# Patient Record
Sex: FEMALE | Race: WHITE | NOT HISPANIC OR LATINO | Employment: FULL TIME | URBAN - METROPOLITAN AREA
[De-identification: names, ages, dates, MRNs, and addresses within clinical notes are randomized per-mention and may not be internally consistent; named-entity substitution may affect disease eponyms.]

---

## 2017-05-08 ENCOUNTER — HOSPITAL ENCOUNTER (OUTPATIENT)
Dept: MAMMOGRAPHY | Facility: CLINIC | Age: 52
Discharge: HOME | End: 2017-05-08
Attending: FAMILY MEDICINE | Admitting: FAMILY MEDICINE
Payer: COMMERCIAL

## 2017-05-08 ENCOUNTER — OFFICE VISIT (OUTPATIENT)
Dept: ENDOCRINOLOGY | Facility: CLINIC | Age: 52
End: 2017-05-08
Payer: COMMERCIAL

## 2017-05-08 VITALS
BODY MASS INDEX: 32.13 KG/M2 | WEIGHT: 212 LBS | DIASTOLIC BLOOD PRESSURE: 85 MMHG | RESPIRATION RATE: 16 BRPM | TEMPERATURE: 97.7 F | HEART RATE: 82 BPM | SYSTOLIC BLOOD PRESSURE: 130 MMHG | HEIGHT: 68 IN

## 2017-05-08 DIAGNOSIS — E06.3 CHRONIC LYMPHOCYTIC THYROIDITIS: ICD-10-CM

## 2017-05-08 DIAGNOSIS — R73.01 IMPAIRED FASTING GLUCOSE: ICD-10-CM

## 2017-05-08 DIAGNOSIS — Z12.31 VISIT FOR SCREENING MAMMOGRAM: ICD-10-CM

## 2017-05-08 DIAGNOSIS — R63.5 ABNORMAL WEIGHT GAIN: ICD-10-CM

## 2017-05-08 DIAGNOSIS — E06.3 HYPOTHYROIDISM DUE TO HASHIMOTO'S THYROIDITIS: ICD-10-CM

## 2017-05-08 DIAGNOSIS — R53.83 FATIGUE, UNSPECIFIED TYPE: ICD-10-CM

## 2017-05-08 DIAGNOSIS — E04.1 THYROID NODULE: Primary | ICD-10-CM

## 2017-05-08 PROCEDURE — 99204 OFFICE O/P NEW MOD 45 MIN: CPT | Performed by: CLINICAL NURSE SPECIALIST

## 2017-05-08 PROCEDURE — G0202 SCR MAMMO BI INCL CAD: HCPCS

## 2017-05-08 RX ORDER — AMPICILLIN TRIHYDRATE 250 MG
CAPSULE ORAL
COMMUNITY
End: 2017-05-22

## 2017-05-08 NOTE — NURSING NOTE
"Chief Complaint   Patient presents with     Thyroid Problem       Initial /85 (BP Location: Left arm, Patient Position: Chair, Cuff Size: Adult Regular)  Pulse 82  Temp 97.7  F (36.5  C) (Oral)  Resp 16  Ht 5' 7.75\" (1.721 m)  Wt 212 lb (96.2 kg)  LMP 08/01/2007  BMI 32.47 kg/m2 Estimated body mass index is 32.47 kg/(m^2) as calculated from the following:    Height as of this encounter: 5' 7.75\" (1.721 m).    Weight as of this encounter: 212 lb (96.2 kg).  Medication Reconciliation: complete    "

## 2017-05-08 NOTE — MR AVS SNAPSHOT
After Visit Summary   5/8/2017    Barbara Smiley    MRN: [id number]           Patient Information     Date Of Birth          1965        Visit Information        Provider Department      5/8/2017 1:30 PM Gena Martinez APRN CNP [facility]        Today's Diagnoses     Thyroid nodule    -  1    Abnormal weight gain        Chronic lymphocytic thyroiditis        Impaired fasting glucose        Hypothyroidism due to Hashimoto's thyroiditis        Fatigue, unspecified type           Follow-ups after your visit        Follow-up notes from your care team     Return in about 2 weeks (around 5/22/2017).      Your next 10 appointments already scheduled     May 12, 2017  8:30 AM CDT   LAB with CR LAB   [facility] ([facility])    [id number] [place] [address] [phone]           Patient must bring picture ID.  Patient should be prepared to give a urine specimen  Please do not eat 10-12 hours before your appointment if you are coming in fasting for labs on lipids, cholesterol, or glucose (sugar).  Pregnant women should follow their Care Team instructions. Water with medications is okay. Do not drink coffee or other fluids.   If you have concerns about taking  your medications, please ask at office or if scheduling via [employer], send a message by clicking on Secure Messaging, Message Your Care Team.            May 12, 2017 10:30 AM CDT   US THYROID with [id number]   [facility] ([facility])    [address]  [facility] [id number] [phone]           Please bring a list of your medicines (including vitamins, minerals and over-the-counter drugs). Also, tell your doctor about any allergies you may have. Wear comfortable clothes and leave your valuables at home.  You do not need to do anything special to prepare for your exam.  Please call the Imaging  Department at your exam site with any questions.            May 22, 2017  3:30 PM CDT   Return Visit with BRIAN Parker CNP   [facility] ([facility])    [address]  [facility] [address] [phone]              Future tests that were ordered for you today     Open Future Orders        Priority Expected Expires Ordered    CBC with platelets Routine  7/8/2017 5/8/2017    Ferritin Routine  7/8/2017 5/8/2017    Vitamin D Deficiency Routine  5/8/2018 5/8/2017    Hemoglobin A1c Routine  8/8/2017 5/8/2017    T3 Free Routine  8/8/2017 5/8/2017    T3 total Routine  8/8/2017 5/8/2017    TSH Routine  8/8/2017 5/8/2017    T4 FREE Routine  8/8/2017 5/8/2017    Thyroid peroxidase antibody Routine  8/8/2017 5/8/2017    Anti thyroglobulin antibody Routine  8/8/2017 5/8/2017    Erythrocyte sedimentation rate auto Routine  8/8/2017 5/8/2017    C-peptide Routine  8/8/2017 5/8/2017    Insulin level Routine  8/8/2017 5/8/2017    Lipid panel reflex to direct LDL Routine  8/8/2017 5/8/2017    Comprehensive metabolic panel Routine  8/8/2017 5/8/2017    US Thyroid Routine  5/8/2018 5/8/2017    Cortisol free urine Routine  5/8/2018 5/8/2017            Who to contact     If you have questions or need follow up information about today's clinic visit or your schedule please contact [facility] directly at [phone].  Normal or non-critical lab and imaging results will be communicated to you by MyChart, letter or phone within 4 business days after the clinic has received the results. If you do not hear from us within 7 days, please contact the clinic through MyChart or phone. If you have a critical or abnormal lab result, we will notify you by phone as soon as possible.  Submit refill requests through [employer] or call your pharmacy and they will forward the refill request to us. Please allow 3 business days for your refill to be completed.          Additional  "Information About Your Visit        MyChart Information     [employer] gives you secure access to your electronic health record. If you see a primary care provider, you can also send messages to your care team and make appointments. If you have questions, please call your primary care clinic.  If you do not have a primary care provider, please call [phone] and they will assist you.        Care EveryWhere ID     This is your Care EveryWhere ID. This could be used by other organizations to access your [place] medical records  [id number]        Your Vitals Were     Pulse Temperature Respirations Height Last Period BMI (Body Mass Index)    82 97.7  F (36.5  C) (Oral) 16 1.721 m (5' 7.75\") 08/01/2007 32.47 kg/m2       Blood Pressure from Last 3 Encounters:   05/08/17 130/85   02/28/14 124/84   02/25/14 124/62    Weight from Last 3 Encounters:   05/08/17 96.2 kg (212 lb)   02/28/14 105.2 kg (232 lb)   02/25/14 105.6 kg (232 lb 14.4 oz)               Primary Care Provider    [facility] Other Clinic                Thank you!     Thank you for choosing [facility]  for your care. Our goal is always to provide you with excellent care. Hearing back from our patients is one way we can continue to improve our services. Please take a few minutes to complete the written survey that you may receive in the mail after your visit with us. Thank you!             Your Updated Medication List - Protect others around you: Learn how to safely use, store and throw away your medicines at www.disposemymeds.org.          This list is accurate as of: 5/8/17  4:59 PM.  Always use your most recent med list.                   Brand Name Dispense Instructions for use    ascorbic acid 500 MG tablet    VITAMIN C     1 tab qd       atorvastatin 20 MG tablet    LIPITOR     Take 20 mg by mouth daily       cinnamon 500 MG Caps          Levothyroxine Sodium 88 MCG Caps          magnesium oxide 200 MG Tabs      daily with breakfast.       " Multi-vitamin Tabs tablet   Generic drug:  multivitamin, therapeutic with minerals          VIACTIV 500-100-40 MG-UNT-MCG Chew   Generic drug:  Calcium-Vitamin D-Vitamin K          Zinc Methionate 50 MG Caps      Take 50 mg by mouth

## 2017-05-08 NOTE — PROGRESS NOTES
Name: Barbara Smiley  Seen at the request of No ref. provider found for   Chief Complaint   Patient presents with     Thyroid Problem     HPI:  Barbara Smiley is a 52 year old female who presents for the evaluation of hypothyroidism and thyroid nodules.  Currently treated with levothyroxine 88 mcg/day.  She was initially noted to have an enlarged thyroid gland on exam during a routine GYN visit in 2007.   Follow up ultrasound showed bilateral thyroid nodules.  She was referred to Dr. Jose, endocrinologist, for further evaluation.  She states labs done by Dr. Jose showed hypothyroidism and Hashimoto's.  She was started on levothyroxine and a FNA biopsy was done.  She recalls the FNA biopsy being benign.    She states there were a number of health insurance changes and she was not able to continue following up with Dr. Jose.  She was subsequently followed by [facility] for awhile, but again, due to insurance changes, she could no longer follow up there.  Her last thyroid ultrasound was done in 2008 - this showed right lobe nodules unchanged, a 1.3 cm solid left lobe nodule that was stable in size, and a newly noted 0.9 cm right isthmus nodule.  She has not had a repeat thyroid nodule since 2008.  She denies any difficulty swallowing or voice changes.  No history of radiation treatments to the head or neck.  No known family history of thyroid cancer although + FH of hypothyroidism.    She continues to feel poorly - constant body/joint aches and fatigue/low energy.  She was previously noted to have IFG.  FH is + for DM2.  She also notes continued weight gain and difficulty losing weight with effort.  She recently started Gladys Castillo + started exercising and has been able to lose 12 lbs.  She was briefly treated with Phentermine while being seen at [facility] but didn't like the way she felt while taking it and didn't feel it was particularly effective.  She wonders if there is anything else besides  levothyroxine to treat her hypothyroidism.      Palpitations:  No  Changes to hair or skin: No  Diarrhea/Constipation:No  Dysphagia or Shortness of breath:No  Muscle aches or pain:Yes: generalized muscle and joint pain  Tremors:No  Difficulty sleeping:Yes: over the past 6-9 months  Changes in weight: Yes: unexplained weight gain, has recently been able to lose 12 lbs with effort (Gladys Jonathan + regular exercise).  Heat or cold intolerance: ? cold  History of Lithium or Amiodarone use:No  Head or neck surgery/radiation:No  IV Contrast: N/A  PMH/PSH:  Past Medical History:   Diagnosis Date     Chronic lymphocytic thyroiditis     Hashimoto thyroiditis     Esophageal reflux      Urinary tract infection, site not specified     in the past     Past Surgical History:   Procedure Laterality Date     ENT SURGERY  toddler    Tosillectomy     GYN SURGERY  2007    novasure     HYSTEROSCOPY  2008    removal of ovarian cyst and endometriosis      Family Hx:  Family History   Problem Relation Age of Onset     C.A.D. Mother      balloon surgery     DIABETES Mother      Cardiovascular Mother      atrial fibrillation      Thyroid Disease Mother      CANCER Mother      Esphogeal Cancer     C.A.D. Father      bypass      DIABETES Father      C.A.D. Paternal Grandmother      DIABETES Paternal Grandmother      C.A.D. Paternal Grandfather      DIABETES Paternal Grandfather      Breast Cancer No family hx of      Cancer - colorectal No family hx of      Prostate Cancer No family hx of      Thyroid disease:  Yes, mother        DM2: Yes, Mother, father, PGM, PGF        Autoimmune: DM1, SLE, RA, Vitiligo     Social Hx:  Social History     Social History     Marital status:      Spouse name: N/A     Number of children: N/A     Years of education: N/A     Occupational History     Not on file.     Social History Main Topics     Smoking status: Never Smoker     Smokeless tobacco: Never Used     Alcohol use Yes      Comment: a couple drinks  "per year     Drug use: No     Sexual activity: Yes     Partners: Male     Birth control/ protection: Surgical     Other Topics Concern     Parent/Sibling W/ Cabg, Mi Or Angioplasty Before 65f 55m? No     Social History Narrative          MEDICATIONS:  has a current medication list which includes the following prescription(s): atorvastatin, levothyroxine sodium, ascorbic acid, multi-vitamin, viactiv, cinnamon, magnesium oxide, and zinc methionate.    ROS   ROS: 10 point ROS neg other than the symptoms noted above in the HPI.    Physical Exam   VS: /85 (BP Location: Left arm, Patient Position: Chair, Cuff Size: Adult Regular)  Pulse 82  Temp 97.7  F (36.5  C) (Oral)  Resp 16  Ht 1.721 m (5' 7.75\")  Wt 96.2 kg (212 lb)  LMP 08/01/2007  BMI 32.47 kg/m2  GENERAL: AXOX3, NAD, well dressed, answering questions appropriately, appears stated age.  HEENT: no exophthalmos, no proptosis, EOMI, no lig lag, no retraction  NECK:  Supple, thyroid gland slightly enlarged and irregular - more prominent on the right, no distinct nodules palpable, no tenderness with palpation, no adenopathy  CV: RRR, no rubs, gallops, no murmurs  LUNGS: CTAB, no wheezes, rales, or rhonchi  ABDOMEN: soft, nontender, nondistended  EXTREMITIES: no edema, +pulses, no rashes, no lesions  NEUROLOGY: CN grossly intact, no tremors  MSK: grossly intact  SKIN: no rashes, no lesions    LABS:  TFTs:  !THYROID Latest Ref Rng & Units 10/26/2010   TSH 0.4 - 5.0 mU/L 0.71     TG/TPO:      ULTRASOUND THYROID 10/25/2007     HISTORY: Thyroid nodule.     COMPARISON: None.     FINDINGS: The right and left lobes of the thyroid gland measure 4.9 x  1.9 x 1.6 cm and 4.8 x 1.9 x 1.5 cm, respectively. Several small  ill-defined hypoechoic nodules are present within both lobes of the  thyroid gland. The largest nodule is in the upper aspect of the left  lobe measuring 1.3 x 1.1 x 0.7 cm. All the other nodules are less than  1 cm in mean diameter. No " microcalcifications associated with any of  the nodules.     IMPRESSION:  1. Several small ill-defined nodules present within a normal sized  thyroid gland.  2. The largest nodule is a solid nodule in the upper aspect of the  left lobe measuring 1.3 x 1.1 x 0.7 cm.    EXAM:  US THYROID 7/10/2008     HISTORY:  Multinodular goiter.  Compare to previous US done 10/25/07.   Stat read and fax results to [phone], pt has 9am appt on  07/11/08.      FINDINGS: The study is compared with the previous of 10/25/2007.     The right lobe measures 4.9 cm in length by 1.7 x 1.9 cm. This is  unchanged. The left lobe measures 4.7 x 1.6 x 1.7 cm and is also  unchanged. No change in the AP dimension of the isthmus at just over 2  mm in diameter.     The nodules within the right lobe are unchanged. However there is a  new solid nodule measuring 9 mm by 4 x 7 within the right isthmus. In  the left upper pole there is a 13 mm solid nodule which is stable.  Stable appearance of a cystic lesion at 7 mm at the left upper pole. A  large nodule that had been seen in the lateral left midpole and at the  lower left pole are not re demonstrated today. No other significant  change.     All pertinent notes, labs, and images personally reviewed by me.     A/P  Ms.Bronwyn ISAIAH Smiley is a 52 year old here for the evaluation of hypothyroidism:    1. Hypothyroidism.  Differential includes: autoimmune disease (Hashimoto's thyroiditis), treatment for hyperthyroidism, radiation therapy, thyroid surgery, medications, congenital disease, pituitary disorder, pregnancy, and iodine deficiency.  Persons with Hashimoto's thyroiditis have serum antibodies reacting with TG, TPO, and against an unidentified protein present in colloid.     Standard treatment for hypothyroidism involves daily use of the synthetic thyroid hormone levothyroxine (Levothroid, Synthroid, others).  The dosage of thyroxine should normally be that required to bring the serum TSH  level to the low normal range, such as 0.3 - 1 uU/ml. This is typically achieved with 1 ug L-T4/lb body weight/day, ranges from 75 - 125 ug/day in women, and 125 - 200 ug/day in men. Once thyroxine treatment is initiated, it is required indefinitely in most patients.     Symptoms should improve one to two weeks after starting treatment. Treatment with levothyroxine is usually lifelong.  Dosage may need to be adjusted based on body weight, medications, or pregnancy.  To determine the right dosage of levothyroxine initially we will repeat TSH and free T4 after two months.    Excessive amounts of the hormone can cause side effects, such as: Increased appetite, insomnia, heart palpitations, and shakiness.  Patients with CAD will be started on a lower dose.  Levothyroxine causes virtually no side effects when used in the appropriate dose.    Certain medications, supplements and foods can affect the body s ability to absorb levothyroxine. Medications include: Iron supplements, Cholestyramine and Calcium supplements.     Patient was advised that decreased absorption of levothyroxine might occur if taken concomitantly with food or within four hours of taking calcium, iron, soy or aluminum containing antacids. Generic substitution for brand name and vice versa, or substitution of one generic formulation for another may cause abnormal TSH levels on a previously stable dose of thyroid hormone. Pt was advised that regular monitoring of thyroid function, as prescribed by the physician, and adherence to dose prescribed, is important.    Will obtain TFT's and adjust levothyroxine dose or consider addition of Cytomel if indicated.    2.  Thyroid nodules:  Thyroid nodules are common and are frequently benign. Data suggest that the prevalence of palpable thyroid nodules is 3% to 7% in [place]; the prevalence is as high as 50% based on ultrasonography (US) or autopsy data. All patients with a palpable thyroid nodule, however,  should undergo US examination. US-guided FNA (US-FNA) is recommended for nodules ?10 mm; US-FNA is suggested for nodules <10 mm only if clinical information or US features are suspicious.    Causes of thyroid Nodules: Benign (Multinodular goiter, Hashimoto s thyroiditis, Simple or hemorrhagic cysts, Follicular adenomas, Subacute thyroiditis) or Malignant(Papillary carcinoma, Follicular carcinoma, Hürthle cell carcinoma, Medullary carcinoma, Anaplastic carcinoma, Primary thyroid lymphoma, or Metastatic malignant lesion).    MultiNodule:  The risk of cancer is not significantly higher in palpable solitary thyroid nodules than in multinodular lesions or in nodules in diffuse goiters. In multinodular thyroid glands, the cytologic sampling should be focused on lesions characterized by suspicious US features rather than on larger or clinically dominant nodules.    Cyst:  Most complex thyroid nodules with a dominant fluid component are benign. USFNA, however, should always be performed because the rare papillary thyroid carcinoma (PTC) can be cystic. An unsatisfactory (nondiagnostic) specimen usually results from a cystic nodule that yields few or no follicular cells. Reaspiration yields satisfactory results in 50% of cases.    Fine-Needle Aspiration Cytologic Diagnosis: 70% of FNA specimens are classified as benign; in addition, 5% are malignant, 10% are suspicious, and 10% to 20% are nondiagnostic or unsatisfactory. At surgical intervention, about 20% of such indeterminate/suspicious specimens are found to be malignant lesions.    Despite good initial technique, repeated biopsy, and US-FNA, approximately 5% of nodules still remain nondiagnostic. Such thyroid nodules should be surgically excised.    Will obtain repeat thyroid ultrasound.  If any significant increase in nodules sizes, will plan to schedule FNA biopsy.    3.  Fatigue, body pain, weight gain.  Will screen for prediabetes/diabetes, cortisol to r/o cushings,  check D level, sed rate, CBC and ferritin, + TFT's to rule out possible causes.    Labs ordered today:   Orders Placed This Encounter   Procedures     US Thyroid     Cortisol free urine     Hemoglobin A1c     T3 Free     T3 total     TSH     T4 FREE     Thyroid peroxidase antibody     Anti thyroglobulin antibody     Erythrocyte sedimentation rate auto     C-peptide     Insulin level     Lipid panel reflex to direct LDL     Comprehensive metabolic panel     Vitamin D Deficiency     CBC with platelets     Ferritin       More than 50% of the time spent with Ms. Smiley on counseling / coordinating her care.  Total face to face time was greater than or equal to 45 minutes.      Follow-up:  follow up in 1-2 week(s) to review test results.    Gena Martinez NP  Endocrinology  [facility]  CC: Other Clinic, [place]

## 2017-05-12 ENCOUNTER — HOSPITAL ENCOUNTER (OUTPATIENT)
Dept: ULTRASOUND IMAGING | Facility: CLINIC | Age: 52
Discharge: HOME | End: 2017-05-12
Attending: CLINICAL NURSE SPECIALIST | Admitting: CLINICAL NURSE SPECIALIST
Payer: COMMERCIAL

## 2017-05-12 DIAGNOSIS — E04.1 THYROID NODULE: ICD-10-CM

## 2017-05-12 DIAGNOSIS — R63.5 ABNORMAL WEIGHT GAIN: ICD-10-CM

## 2017-05-12 PROCEDURE — 99000 SPECIMEN HANDLING OFFICE-LAB: CPT | Performed by: CLINICAL NURSE SPECIALIST

## 2017-05-12 PROCEDURE — 76536 US EXAM OF HEAD AND NECK: CPT

## 2017-05-12 PROCEDURE — 82530 CORTISOL FREE: CPT | Mod: 90 | Performed by: CLINICAL NURSE SPECIALIST

## 2017-05-13 DIAGNOSIS — R53.83 FATIGUE, UNSPECIFIED TYPE: ICD-10-CM

## 2017-05-13 DIAGNOSIS — R73.01 IMPAIRED FASTING GLUCOSE: ICD-10-CM

## 2017-05-13 DIAGNOSIS — E04.1 THYROID NODULE: ICD-10-CM

## 2017-05-13 DIAGNOSIS — R63.5 ABNORMAL WEIGHT GAIN: ICD-10-CM

## 2017-05-13 DIAGNOSIS — E06.3 CHRONIC LYMPHOCYTIC THYROIDITIS: ICD-10-CM

## 2017-05-13 DIAGNOSIS — E06.3 HYPOTHYROIDISM DUE TO HASHIMOTO'S THYROIDITIS: ICD-10-CM

## 2017-05-13 LAB
ALBUMIN SERPL-MCNC: 4 G/DL (ref 3.4–5)
ALP SERPL-CCNC: 85 U/L (ref 40–150)
ALT SERPL W P-5'-P-CCNC: 37 U/L (ref 0–50)
ANION GAP SERPL CALCULATED.3IONS-SCNC: 9 MMOL/L (ref 3–14)
AST SERPL W P-5'-P-CCNC: 11 U/L (ref 0–45)
BILIRUB SERPL-MCNC: 0.4 MG/DL (ref 0.2–1.3)
BUN SERPL-MCNC: 15 MG/DL (ref 7–30)
CALCIUM SERPL-MCNC: 9.2 MG/DL (ref 8.5–10.1)
CHLORIDE SERPL-SCNC: 110 MMOL/L (ref 94–109)
CHOLEST SERPL-MCNC: 157 MG/DL
CO2 SERPL-SCNC: 24 MMOL/L (ref 20–32)
CREAT SERPL-MCNC: 0.74 MG/DL (ref 0.52–1.04)
ERYTHROCYTE [DISTWIDTH] IN BLOOD BY AUTOMATED COUNT: 13 % (ref 10–15)
ERYTHROCYTE [SEDIMENTATION RATE] IN BLOOD BY WESTERGREN METHOD: 9 MM/H (ref 0–30)
FERRITIN SERPL-MCNC: 112 NG/ML (ref 8–252)
GFR SERPL CREATININE-BSD FRML MDRD: 82 ML/MIN/1.7M2
GLUCOSE SERPL-MCNC: 100 MG/DL (ref 70–99)
HBA1C MFR BLD: 5.5 % (ref 4.3–6)
HCT VFR BLD AUTO: 40 % (ref 35–47)
HDLC SERPL-MCNC: 45 MG/DL
HGB BLD-MCNC: 13.4 G/DL (ref 11.7–15.7)
INSULIN SERPL-ACNC: 19.9 MU/L (ref 3–25)
LDLC SERPL CALC-MCNC: 90 MG/DL
MCH RBC QN AUTO: 29.4 PG (ref 26.5–33)
MCHC RBC AUTO-ENTMCNC: 33.5 G/DL (ref 31.5–36.5)
MCV RBC AUTO: 88 FL (ref 78–100)
NONHDLC SERPL-MCNC: 112 MG/DL
PLATELET # BLD AUTO: 300 10E9/L (ref 150–450)
POTASSIUM SERPL-SCNC: 4 MMOL/L (ref 3.4–5.3)
PROT SERPL-MCNC: 7.2 G/DL (ref 6.8–8.8)
RBC # BLD AUTO: 4.56 10E12/L (ref 3.8–5.2)
SODIUM SERPL-SCNC: 143 MMOL/L (ref 133–144)
T3 SERPL-MCNC: 87 NG/DL (ref 60–181)
T3FREE SERPL-MCNC: 2.3 PG/ML (ref 2.3–4.2)
T4 FREE SERPL-MCNC: 0.88 NG/DL (ref 0.76–1.46)
TRIGL SERPL-MCNC: 108 MG/DL
TSH SERPL DL<=0.05 MIU/L-ACNC: 1.65 MU/L (ref 0.4–4)
WBC # BLD AUTO: 7.8 10E9/L (ref 4–11)

## 2017-05-13 PROCEDURE — 86376 MICROSOMAL ANTIBODY EACH: CPT | Performed by: CLINICAL NURSE SPECIALIST

## 2017-05-13 PROCEDURE — 84480 ASSAY TRIIODOTHYRONINE (T3): CPT | Performed by: CLINICAL NURSE SPECIALIST

## 2017-05-13 PROCEDURE — 82306 VITAMIN D 25 HYDROXY: CPT | Performed by: CLINICAL NURSE SPECIALIST

## 2017-05-13 PROCEDURE — 84481 FREE ASSAY (FT-3): CPT | Performed by: CLINICAL NURSE SPECIALIST

## 2017-05-13 PROCEDURE — 86800 THYROGLOBULIN ANTIBODY: CPT | Performed by: CLINICAL NURSE SPECIALIST

## 2017-05-13 PROCEDURE — 84443 ASSAY THYROID STIM HORMONE: CPT | Performed by: CLINICAL NURSE SPECIALIST

## 2017-05-13 PROCEDURE — 84681 ASSAY OF C-PEPTIDE: CPT | Performed by: CLINICAL NURSE SPECIALIST

## 2017-05-13 PROCEDURE — 85652 RBC SED RATE AUTOMATED: CPT | Performed by: CLINICAL NURSE SPECIALIST

## 2017-05-13 PROCEDURE — 83525 ASSAY OF INSULIN: CPT | Performed by: CLINICAL NURSE SPECIALIST

## 2017-05-13 PROCEDURE — 85027 COMPLETE CBC AUTOMATED: CPT | Performed by: CLINICAL NURSE SPECIALIST

## 2017-05-13 PROCEDURE — 83036 HEMOGLOBIN GLYCOSYLATED A1C: CPT | Performed by: CLINICAL NURSE SPECIALIST

## 2017-05-13 PROCEDURE — 82728 ASSAY OF FERRITIN: CPT | Performed by: CLINICAL NURSE SPECIALIST

## 2017-05-13 PROCEDURE — 80061 LIPID PANEL: CPT | Performed by: CLINICAL NURSE SPECIALIST

## 2017-05-13 PROCEDURE — 84439 ASSAY OF FREE THYROXINE: CPT | Performed by: CLINICAL NURSE SPECIALIST

## 2017-05-13 PROCEDURE — 36415 COLL VENOUS BLD VENIPUNCTURE: CPT | Performed by: CLINICAL NURSE SPECIALIST

## 2017-05-13 PROCEDURE — 80053 COMPREHEN METABOLIC PANEL: CPT | Performed by: CLINICAL NURSE SPECIALIST

## 2017-05-14 LAB
COLLECT DURATION TIME SPEC: NORMAL H
CORTIS F 24H UR HPLC-MCNC: 6.9 UG/ML
CORTIS F 24H UR-MRATE: 20.4
CORTIS F/CREAT 24H UR: 16.83
CREAT 24H UR-MCNC: 41 MG/DL
CREAT 24H UR-MRATE: 1210 G/(24.H)
DEPRECATED CALCIDIOL+CALCIFEROL SERPL-MC: 48 UG/L (ref 20–75)
IMP & REVIEW OF LAB RESULTS: NORMAL
SPECIMEN VOL ?TM UR: NORMAL ML

## 2017-05-15 LAB
C PEPTIDE SERPL-MCNC: 2.6 NG/ML (ref 0.9–6.9)
THYROGLOB AB SERPL IA-ACNC: <20 IU/ML (ref 0–40)
THYROPEROXIDASE AB SERPL-ACNC: 191 IU/ML

## 2017-05-16 NOTE — PROGRESS NOTES
Barbara,  Your thyroid antibodies were elevated confirming that you have autoimmune thyroid disease.  Your vitamin D and iron levels look good.  Your glucose measures did show elevated, or impaired fasting glucose.  I don't see any obvious cause of your current symptoms.  I recommend trying Metformin to treat the impaired fasting glucose and see if that helps with the weight issues and possibly with your symptoms.  Start with one Metformin per day, taken with food.  Increase to one tablet twice daily in one week if tolerating ok.  I'll send the prescription to your pharmacy.  Let me know if you have any questions.  I'll see you at your follow up visit.  Gena Martinez NP  Endocrinology

## 2017-05-16 NOTE — PROGRESS NOTES
Barbara,  Good news!  Your recent thyroid ultrasound did not show any thyroid nodules.  There were findings indicating thyroiditis, or inflammation.  This is due to the autoimmune thyroid disease (Hashimoto's).  Here's a copy of the results for your records.  We will discuss the results in more detail at your follow up visit.  Gena Martinez NP  Endocrinology

## 2017-05-22 ENCOUNTER — OFFICE VISIT (OUTPATIENT)
Dept: ENDOCRINOLOGY | Facility: CLINIC | Age: 52
End: 2017-05-22
Payer: COMMERCIAL

## 2017-05-22 VITALS
TEMPERATURE: 97.3 F | WEIGHT: 221 LBS | HEART RATE: 77 BPM | DIASTOLIC BLOOD PRESSURE: 80 MMHG | BODY MASS INDEX: 33.85 KG/M2 | SYSTOLIC BLOOD PRESSURE: 155 MMHG

## 2017-05-22 DIAGNOSIS — R73.01 IMPAIRED FASTING GLUCOSE: ICD-10-CM

## 2017-05-22 DIAGNOSIS — E03.4 HYPOTHYROIDISM DUE TO ACQUIRED ATROPHY OF THYROID: Primary | ICD-10-CM

## 2017-05-22 DIAGNOSIS — Z86.39 HX OF THYROID NODULE: ICD-10-CM

## 2017-05-22 DIAGNOSIS — E06.3 CHRONIC LYMPHOCYTIC THYROIDITIS: ICD-10-CM

## 2017-05-22 PROCEDURE — 99214 OFFICE O/P EST MOD 30 MIN: CPT | Performed by: CLINICAL NURSE SPECIALIST

## 2017-05-22 RX ORDER — AMPICILLIN TRIHYDRATE 250 MG
CAPSULE ORAL
COMMUNITY
End: 2017-08-17

## 2017-05-22 NOTE — MR AVS SNAPSHOT
After Visit Summary   5/22/2017    Barbara Smiley    MRN: [id number]           Patient Information     Date Of Birth          1965        Visit Information        Provider Department      5/22/2017 3:30 PM Gena Martinez APRN CNP [facility]        Today's Diagnoses     Hypothyroidism due to acquired atrophy of thyroid    -  1    Chronic lymphocytic thyroiditis        Impaired fasting glucose        Hx of thyroid nodule           Follow-ups after your visit        Follow-up notes from your care team     Return in about 3 months (around 8/22/2017).      Who to contact     If you have questions or need follow up information about today's clinic visit or your schedule please contact [facility] directly at [phone].  Normal or non-critical lab and imaging results will be communicated to you by [employer], letter or phone within 4 business days after the clinic has received the results. If you do not hear from us within 7 days, please contact the clinic through [employer] or phone. If you have a critical or abnormal lab result, we will notify you by phone as soon as possible.  Submit refill requests through [employer] or call your pharmacy and they will forward the refill request to us. Please allow 3 business days for your refill to be completed.          Additional Information About Your Visit        MyChart Information     [employer] gives you secure access to your electronic health record. If you see a primary care provider, you can also send messages to your care team and make appointments. If you have questions, please call your primary care clinic.  If you do not have a primary care provider, please call [phone] and they will assist you.        Care EveryWhere ID     This is your Care EveryWhere ID. This could be used by other organizations to access your [place] medical records  [id number]        Your Vitals Were     Pulse Temperature Last  Period BMI (Body Mass Index)          77 97.3  F (36.3  C) (Oral) 08/01/2007 33.85 kg/m2         Blood Pressure from Last 3 Encounters:   05/22/17 155/80   05/08/17 130/85   02/28/14 124/84    Weight from Last 3 Encounters:   05/22/17 100.2 kg (221 lb)   05/08/17 96.2 kg (212 lb)   02/28/14 105.2 kg (232 lb)              Today, you had the following     No orders found for display       Primary Care Provider    M[facility] Other Clinic                Thank you!     Thank you for choosing [facility]  for your care. Our goal is always to provide you with excellent care. Hearing back from our patients is one way we can continue to improve our services. Please take a few minutes to complete the written survey that you may receive in the mail after your visit with us. Thank you!             Your Updated Medication List - Protect others around you: Learn how to safely use, store and throw away your medicines at www.disposemymeds.org.          This list is accurate as of: 5/22/17 11:59 PM.  Always use your most recent med list.                   Brand Name Dispense Instructions for use    ascorbic acid 500 MG tablet    VITAMIN C     1 tab qd       atorvastatin 20 MG tablet    LIPITOR     Take 20 mg by mouth daily       cinnamon 500 MG Caps          Levothyroxine Sodium 88 MCG Caps          magnesium oxide 200 MG Tabs      daily with breakfast.       metFORMIN 500 MG tablet    GLUCOPHAGE    60 tablet    Take 1 tablet (500 mg) by mouth 2 times daily (with meals)       Multi-vitamin Tabs tablet   Generic drug:  multivitamin, therapeutic with minerals          VIACTIV 500-100-40 MG-UNT-MCG Chew   Generic drug:  Calcium-Vitamin D-Vitamin K          Zinc Methionate 50 MG Caps      Take 50 mg by mouth

## 2017-05-22 NOTE — PROGRESS NOTES
Name: Barbara Smiley (Last seen 5/8/2017)  F/u for   Chief Complaint   Patient presents with     Thyroid Problem     HPI:  Barbara Smiley is a 52 year old female who presents for the evaluation of hypothyroidism and thyroid nodules.  Currently treated with levothyroxine 88 mcg/day.  She was initially noted to have an enlarged thyroid gland on exam during a routine GYN visit in 2007.   Follow up ultrasound showed bilateral thyroid nodules.  She was referred to Dr. Jose, endocrinologist, for further evaluation.  She states labs done by Dr. Jose showed hypothyroidism and Hashimoto's.  She was started on levothyroxine and a FNA biopsy was done.  She recalls the FNA biopsy being benign.    She states there were a number of health insurance changes and she was not able to continue following up with Dr. Jose.  She was subsequently followed by [facility] for awhile, but again, due to insurance changes, she could no longer follow up there.  Her last thyroid ultrasound was done in 2008 - this showed right lobe nodules unchanged, a 1.3 cm solid left lobe nodule that was stable in size, and a newly noted 0.9 cm right isthmus nodule.  She has not had a repeat thyroid nodule since 2008.  She denies any difficulty swallowing or voice changes.  No history of radiation treatments to the head or neck.  No known family history of thyroid cancer although + FH of hypothyroidism.    She continues to feel poorly - constant body/joint aches and fatigue/low energy.  She was previously noted to have IFG.  FH is + for DM2.  She also notes continued weight gain and difficulty losing weight with effort.  She recently started Gladys Castillo + started exercising and has been able to lose 12 lbs.  She was briefly treated with Phentermine while being seen at [facility] but didn't like the way she felt while taking it and didn't feel it was particularly effective.  She wonders if there is anything else besides levothyroxine to  treat her hypothyroidism.      Palpitations:  No  Changes to hair or skin: No  Diarrhea/Constipation:No  Dysphagia or Shortness of breath:No  Muscle aches or pain:Yes: generalized muscle and joint pain  Tremors:No  Difficulty sleeping:Yes: over the past 6-9 months  Changes in weight: Yes: unexplained weight gain, has recently been able to lose 12 lbs with effort (Gladys Jonathan + regular exercise).  Heat or cold intolerance: ? cold  History of Lithium or Amiodarone use:No  Head or neck surgery/radiation:No  IV Contrast: N/A  PMH/PSH:  Past Medical History:   Diagnosis Date     Chronic lymphocytic thyroiditis     Hashimoto thyroiditis     Esophageal reflux      Urinary tract infection, site not specified     in the past     Past Surgical History:   Procedure Laterality Date     ENT SURGERY  toddler    Tosillectomy     GYN SURGERY  2007    novasure     HYSTEROSCOPY  2008    removal of ovarian cyst and endometriosis      Family Hx:  Family History   Problem Relation Age of Onset     C.A.D. Mother      balloon surgery     DIABETES Mother      Cardiovascular Mother      atrial fibrillation      Thyroid Disease Mother      CANCER Mother      Esphogeal Cancer     C.A.D. Father      bypass      DIABETES Father      C.A.D. Paternal Grandmother      DIABETES Paternal Grandmother      C.A.D. Paternal Grandfather      DIABETES Paternal Grandfather      Breast Cancer No family hx of      Cancer - colorectal No family hx of      Prostate Cancer No family hx of      Thyroid disease:  Yes, mother        DM2: Yes, Mother, father, PGM, PGF        Autoimmune: DM1, SLE, RA, Vitiligo     Social Hx:  Social History     Social History     Marital status:      Spouse name: N/A     Number of children: N/A     Years of education: N/A     Occupational History     Not on file.     Social History Main Topics     Smoking status: Never Smoker     Smokeless tobacco: Never Used     Alcohol use Yes      Comment: a couple drinks per year     Drug  use: No     Sexual activity: Yes     Partners: Male     Birth control/ protection: Surgical     Other Topics Concern     Parent/Sibling W/ Cabg, Mi Or Angioplasty Before 65f 55m? No     Social History Narrative          MEDICATIONS:  has a current medication list which includes the following prescription(s): metformin, magnesium oxide, zinc methionate, atorvastatin, levothyroxine sodium, ascorbic acid, multi-vitamin, viactiv, and cinnamon.    ROS   ROS: 10 point ROS neg other than the symptoms noted above in the HPI.    Physical Exam   VS: LMP 08/01/2007  GENERAL: AXOX3, NAD, well dressed, answering questions appropriately, appears stated age.  HEENT: no exophthalmos, no proptosis, EOMI, no lig lag, no retraction  NECK:  Supple, thyroid gland slightly enlarged and irregular - more prominent on the right, no distinct nodules palpable, no tenderness with palpation, no adenopathy  CV: RRR, no rubs, gallops, no murmurs  LUNGS: CTAB, no wheezes, rales, or rhonchi  ABDOMEN: soft, nontender, nondistended  EXTREMITIES: no edema, +pulses, no rashes, no lesions  NEUROLOGY: CN grossly intact, no tremors  MSK: grossly intact  SKIN: no rashes, no lesions    LABS:  TFTs:  Component    Latest Ref Rng & Units 5/13/2017   Free T3    2.3 - 4.2 pg/mL 2.3   Triiodothyronine (T3)    60 - 181 ng/dL 87   TSH    0.40 - 4.00 mU/L 1.65   T4 Free    0.76 - 1.46 ng/dL 0.88   Thyroid Peroxidase Antibody    <35 IU/mL 191 (H)   Thyroglobulin Antibody    <40 IU/mL <20     !COMPREHENSIVE Latest Ref Rng & Units 5/13/2017   SODIUM 133 - 144 mmol/L 143   POTASSIUM 3.4 - 5.3 mmol/L 4.0   CHLORIDE 94 - 109 mmol/L 110 (H)   CO2 mmol/L    BUN 7 - 30 mg/dL 15   Creatinine 0.52 - 1.04 mg/dL 0.74   Glucose 70 - 99 mg/dL 100 (H)   ANION GAP 3 - 14 mmol/L 9   CALCIUM 8.5 - 10.1 mg/dL 9.2     !COMPREHENSIVE Latest Ref Rng & Units 10/26/2010 2/6/2013 2/25/2014   Glucose 70 - 99 mg/dL 110 (H) 85 102 (H)     !COMPREHENSIVE Latest Ref Rng & Units 5/13/2017    Glucose 70 - 99 mg/dL 100 (H)     Component    Latest Ref Rng & Units 5/13/2017   Hemoglobin A1C    4.3 - 6.0 % 5.5   C-Peptide    0.9 - 6.9 ng/mL 2.6   Insulin    3 - 25 mU/L 19.9     !LIPID/HEPATIC Latest Ref Rng & Units 5/13/2017   CHOLESTEROL <200 mg/dL 157   TRIGLYCERIDES <150 mg/dL 108   HDL CHOLESTEROL >49 mg/dL 45 (L)   LDL CHOLESTEROL, CALCULATED <100 mg/dL 90   VLDL-CHOLESTEROL 0 - 30 mg/dL    NON HDL CHOLESTEROL <130 mg/dL 112   CHOLESTEROL/HDL RATIO 0.0 - 5.0    AST 0 - 45 U/L 11   ALT 0 - 50 U/L 37     Component    Latest Ref Rng & Units 5/13/2017   WBC    4.0 - 11.0 10e9/L 7.8   RBC Count    3.8 - 5.2 10e12/L 4.56   Hemoglobin    11.7 - 15.7 g/dL 13.4   Hematocrit    35.0 - 47.0 % 40.0   MCV    78 - 100 fl 88   MCH    26.5 - 33.0 pg 29.4   MCHC    31.5 - 36.5 g/dL 33.5   RDW    10.0 - 15.0 % 13.0   Platelet Count    150 - 450 10e9/L 300   Sed Rate    0 - 30 mm/h 9     Component      Latest Ref Rng & Units 5/13/2017   Ferritin      8 - 252 ng/mL 112     Component    Latest Ref Rng & Units 5/13/2017   Vitamin D Deficiency screening    20 - 75 ug/L 48     Component    Latest Ref Rng & Units 5/12/2017   Cortisol Free Duration Urine    h 24 HR   Volume    mL 2950 ML   Cortisol ug/g creatinine   18 years and older: Less than 32 ug/g crt   16.83   Cortisol Free Urine Random     6.90   Cortisol Free Urine     20.4   Creat/Vol     41   Creat/24hr     1210     ULTRASOUND THYROID 10/25/2007     HISTORY: Thyroid nodule.     COMPARISON: None.     FINDINGS: The right and left lobes of the thyroid gland measure 4.9 x  1.9 x 1.6 cm and 4.8 x 1.9 x 1.5 cm, respectively. Several small  ill-defined hypoechoic nodules are present within both lobes of the  thyroid gland. The largest nodule is in the upper aspect of the left  lobe measuring 1.3 x 1.1 x 0.7 cm. All the other nodules are less than  1 cm in mean diameter. No microcalcifications associated with any of  the nodules.     IMPRESSION:  1. Several small  ill-defined nodules present within a normal sized  thyroid gland.  2. The largest nodule is a solid nodule in the upper aspect of the  left lobe measuring 1.3 x 1.1 x 0.7 cm.    EXAM:  US THYROID 7/10/2008     HISTORY:  Multinodular goiter.  Compare to previous US done 10/25/07.   Stat read and fax results to [phone], pt has 9am appt on  07/11/08.      FINDINGS: The study is compared with the previous of 10/25/2007.     The right lobe measures 4.9 cm in length by 1.7 x 1.9 cm. This is  unchanged. The left lobe measures 4.7 x 1.6 x 1.7 cm and is also  unchanged. No change in the AP dimension of the isthmus at just over 2  mm in diameter.     The nodules within the right lobe are unchanged. However there is a  new solid nodule measuring 9 mm by 4 x 7 within the right isthmus. In  the left upper pole there is a 13 mm solid nodule which is stable.  Stable appearance of a cystic lesion at 7 mm at the left upper pole. A  large nodule that had been seen in the lateral left midpole and at the  lower left pole are not re demonstrated today. No other significant  Change.    ULTRASOUND THYROID 5/12/2017      HISTORY: Nontoxic single thyroid nodule.      COMPARISON: Thyroid ultrasound 7/10/2008.     FINDINGS: Thyroid ultrasound demonstrates a normal sized gland. The  right lobe measures 4.3 x 1.7 x 1.4 cm. The left lobe measures 4.3 x  1.6 x 1.7 cm. The isthmus is normal in thickness. Thyroid parenchyma  is heterogeneous in echotexture. Increased color Doppler flow is noted  throughout the gland, likely indicating underlying thyroiditis.     Thyroid nodules as follows:   Right Lobe: None.     Isthmus: None.     Left Lobe: None.         IMPRESSION: Normal-sized thyroid gland which is heterogeneous in  appearance. No discrete nodules. Increased color Doppler flow is  consistent with underlying thyroiditis.  All pertinent notes, labs, and images personally reviewed by me.     A/P  Ms.Bronwyn ISAIAH Smiley is a 52 year old  here for the evaluation of hypothyroidism:    1. Hypothyroidism secondary to Hashimoto's.  Differential includes: autoimmune disease (Hashimoto's thyroiditis), treatment for hyperthyroidism, radiation therapy, thyroid surgery, medications, congenital disease, pituitary disorder, pregnancy, and iodine deficiency.  Persons with Hashimoto's thyroiditis have serum antibodies reacting with TG, TPO, and against an unidentified protein present in colloid.     Standard treatment for hypothyroidism involves daily use of the synthetic thyroid hormone levothyroxine (Levothroid, Synthroid, others).  The dosage of thyroxine should normally be that required to bring the serum TSH level to the low normal range, such as 0.3 - 1 uU/ml. This is typically achieved with 1 ug L-T4/lb body weight/day, ranges from 75 - 125 ug/day in women, and 125 - 200 ug/day in men. Once thyroxine treatment is initiated, it is required indefinitely in most patients.     Symptoms should improve one to two weeks after starting treatment. Treatment with levothyroxine is usually lifelong.  Dosage may need to be adjusted based on body weight, medications, or pregnancy.  To determine the right dosage of levothyroxine initially we will repeat TSH and free T4 after two months.    Excessive amounts of the hormone can cause side effects, such as: Increased appetite, insomnia, heart palpitations, and shakiness.  Patients with CAD will be started on a lower dose.  Levothyroxine causes virtually no side effects when used in the appropriate dose.    Certain medications, supplements and foods can affect the body s ability to absorb levothyroxine. Medications include: Iron supplements, Cholestyramine and Calcium supplements.     Patient was advised that decreased absorption of levothyroxine might occur if taken concomitantly with food or within four hours of taking calcium, iron, soy or aluminum containing antacids. Generic substitution for brand name and vice versa,  or substitution of one generic formulation for another may cause abnormal TSH levels on a previously stable dose of thyroid hormone. Pt was advised that regular monitoring of thyroid function, as prescribed by the physician, and adherence to dose prescribed, is important.    Current TFT's in target range.  Continue Levothyroxine 88 mcg/day.    2.  History of thyroid nodules - now resolved:  Thyroid nodules are common and are frequently benign. Data suggest that the prevalence of palpable thyroid nodules is 3% to 7% in [place]; the prevalence is as high as 50% based on ultrasonography (US) or autopsy data. All patients with a palpable thyroid nodule, however, should undergo US examination. US-guided FNA (US-FNA) is recommended for nodules ?10 mm; US-FNA is suggested for nodules <10 mm only if clinical information or US features are suspicious.    Causes of thyroid Nodules: Benign (Multinodular goiter, Hashimoto s thyroiditis, Simple or hemorrhagic cysts, Follicular adenomas, Subacute thyroiditis) or Malignant(Papillary carcinoma, Follicular carcinoma, Hürthle cell carcinoma, Medullary carcinoma, Anaplastic carcinoma, Primary thyroid lymphoma, or Metastatic malignant lesion).    MultiNodule:  The risk of cancer is not significantly higher in palpable solitary thyroid nodules than in multinodular lesions or in nodules in diffuse goiters. In multinodular thyroid glands, the cytologic sampling should be focused on lesions characterized by suspicious US features rather than on larger or clinically dominant nodules.    Cyst:  Most complex thyroid nodules with a dominant fluid component are benign. USFNA, however, should always be performed because the rare papillary thyroid carcinoma (PTC) can be cystic. An unsatisfactory (nondiagnostic) specimen usually results from a cystic nodule that yields few or no follicular cells. Reaspiration yields satisfactory results in 50% of cases.    Fine-Needle Aspiration Cytologic  Diagnosis: 70% of FNA specimens are classified as benign; in addition, 5% are malignant, 10% are suspicious, and 10% to 20% are nondiagnostic or unsatisfactory. At surgical intervention, about 20% of such indeterminate/suspicious specimens are found to be malignant lesions.    Despite good initial technique, repeated biopsy, and US-FNA, approximately 5% of nodules still remain nondiagnostic. Such thyroid nodules should be surgically excised.    Repeat thyroid ultrasound showed no nodules.    3.  IFG.  Start Metformin 500 mg --> 1000 mg/day as tolerated.      Labs ordered today:   No orders of the defined types were placed in this encounter.      More than 50% of the time spent with Ms. Smiley on counseling / coordinating her care.  Total face to face time was greater than or equal to 25 minutes.      Follow-up:  follow up in 6-12 weeks    Gena Martinez NP  Endocrinology  [facility]  CC: Other Clinic, [place]

## 2017-05-22 NOTE — NURSING NOTE
"Chief Complaint   Patient presents with     Thyroid Problem       Initial /80 (BP Location: Left arm, Patient Position: Chair, Cuff Size: Adult Regular)  Pulse 77  Temp 97.3  F (36.3  C) (Oral)  Wt 221 lb (100.2 kg)  LMP 08/01/2007  BMI 33.85 kg/m2 Estimated body mass index is 33.85 kg/(m^2) as calculated from the following:    Height as of 5/8/17: 5' 7.75\" (1.721 m).    Weight as of this encounter: 221 lb (100.2 kg).  Medication Reconciliation: complete    "

## 2017-05-30 ENCOUNTER — MYC MEDICAL ADVICE (OUTPATIENT)
Dept: ENDOCRINOLOGY | Facility: CLINIC | Age: 52
End: 2017-05-30

## 2017-06-10 ENCOUNTER — HEALTH MAINTENANCE LETTER (OUTPATIENT)
Age: 52
End: 2017-06-10

## 2017-08-17 ENCOUNTER — OFFICE VISIT (OUTPATIENT)
Dept: ENDOCRINOLOGY | Facility: CLINIC | Age: 52
End: 2017-08-17
Payer: COMMERCIAL

## 2017-08-17 VITALS
BODY MASS INDEX: 29.1 KG/M2 | TEMPERATURE: 97.5 F | HEART RATE: 66 BPM | SYSTOLIC BLOOD PRESSURE: 114 MMHG | WEIGHT: 190 LBS | DIASTOLIC BLOOD PRESSURE: 70 MMHG | RESPIRATION RATE: 16 BRPM | OXYGEN SATURATION: 97 %

## 2017-08-17 DIAGNOSIS — E66.09 NON MORBID OBESITY DUE TO EXCESS CALORIES: ICD-10-CM

## 2017-08-17 DIAGNOSIS — R73.01 IMPAIRED FASTING GLUCOSE: Primary | ICD-10-CM

## 2017-08-17 DIAGNOSIS — E06.3 CHRONIC LYMPHOCYTIC THYROIDITIS: ICD-10-CM

## 2017-08-17 DIAGNOSIS — E78.5 HYPERLIPIDEMIA LDL GOAL <130: ICD-10-CM

## 2017-08-17 PROBLEM — E04.1 THYROID NODULE: Status: RESOLVED | Noted: 2017-05-08 | Resolved: 2017-08-17

## 2017-08-17 LAB
CHOLEST SERPL-MCNC: 150 MG/DL
GLUCOSE SERPL-MCNC: 99 MG/DL (ref 70–99)
HBA1C MFR BLD: 5.1 % (ref 4.3–6)
HDLC SERPL-MCNC: 43 MG/DL
INSULIN SERPL-ACNC: 9.5 MU/L (ref 3–25)
LDLC SERPL CALC-MCNC: 94 MG/DL
NONHDLC SERPL-MCNC: 107 MG/DL
T4 FREE SERPL-MCNC: 1.13 NG/DL (ref 0.76–1.46)
TRIGL SERPL-MCNC: 64 MG/DL
TSH SERPL DL<=0.005 MIU/L-ACNC: 0.34 MU/L (ref 0.4–4)

## 2017-08-17 PROCEDURE — 80061 LIPID PANEL: CPT | Performed by: CLINICAL NURSE SPECIALIST

## 2017-08-17 PROCEDURE — 99214 OFFICE O/P EST MOD 30 MIN: CPT | Performed by: CLINICAL NURSE SPECIALIST

## 2017-08-17 PROCEDURE — 84439 ASSAY OF FREE THYROXINE: CPT | Performed by: CLINICAL NURSE SPECIALIST

## 2017-08-17 PROCEDURE — 36415 COLL VENOUS BLD VENIPUNCTURE: CPT | Performed by: CLINICAL NURSE SPECIALIST

## 2017-08-17 PROCEDURE — 82947 ASSAY GLUCOSE BLOOD QUANT: CPT | Performed by: CLINICAL NURSE SPECIALIST

## 2017-08-17 PROCEDURE — 84443 ASSAY THYROID STIM HORMONE: CPT | Performed by: CLINICAL NURSE SPECIALIST

## 2017-08-17 PROCEDURE — 83525 ASSAY OF INSULIN: CPT | Performed by: CLINICAL NURSE SPECIALIST

## 2017-08-17 PROCEDURE — 83036 HEMOGLOBIN GLYCOSYLATED A1C: CPT | Performed by: CLINICAL NURSE SPECIALIST

## 2017-08-17 NOTE — NURSING NOTE
"Chief Complaint   Patient presents with     Thyroid Problem     Weight Problem     managment       Initial /70 (BP Location: Left arm, Patient Position: Chair, Cuff Size: Adult Large)  Pulse 66  Temp 97.5  F (36.4  C) (Oral)  Resp 16  Wt 190 lb (86.2 kg)  LMP 08/01/2007  SpO2 97%  Breastfeeding? No  BMI 29.1 kg/m2 Estimated body mass index is 29.1 kg/(m^2) as calculated from the following:    Height as of 5/8/17: 5' 7.75\" (1.721 m).    Weight as of this encounter: 190 lb (86.2 kg).  Medication Reconciliation: complete  Neisha Peng M.A.  "

## 2017-08-17 NOTE — PROGRESS NOTES
Name: Barbara Smiley (Last seen 5/22/2017)  F/u for   Chief Complaint   Patient presents with     Thyroid Problem     Weight Problem     managment     HPI:  Barbara Smiley is a 52 year old female who presents for the management of hypothyroidism and thyroid nodules.    Currently treated with levothyroxine 88 mcg/day.  She was initially noted to have an enlarged thyroid gland on exam during a routine GYN visit in 2007.   Follow up ultrasound showed bilateral thyroid nodules.  She was referred to Dr. Jose, endocrinologist, for further evaluation.  She states labs done by Dr. Jose showed hypothyroidism and Hashimoto's.  She was started on levothyroxine and a FNA biopsy was done.  She recalls the FNA biopsy being benign.      She states there were a number of health insurance changes and she was not able to continue following up with Dr. Jose.  She was subsequently followed by [facility] for awhile, but again, due to insurance changes, she could no longer follow up there.    Her last thyroid ultrasound was done in 2008 - this showed right lobe nodules unchanged, a 1.3 cm solid left lobe nodule that was stable in size, and a newly noted 0.9 cm right isthmus nodule.  She has not had a repeat thyroid nodule since 2008.  She denies any difficulty swallowing or voice changes.  No history of radiation treatments to the head or neck.  No known family history of thyroid cancer although + FH of hypothyroidism.    She was previously able to lose 12 lbs with Gladys Jonathan + exercise.  She was briefly treated with Phentermine while being seen at [facility] but didn't like the way she felt while taking it and didn't feel it was particularly effective.    Has recently been able to lose weight with a program called [employer].  Has lost 31 lbs in the past 3 month.    Her ultimate goal is to lose enough weight that she can get off all medications, the atorvastatin and metformin.    PMH/PSH:  Past Medical History:    Diagnosis Date     Chronic lymphocytic thyroiditis     Hashimoto thyroiditis     Esophageal reflux      Urinary tract infection, site not specified     in the past     Past Surgical History:   Procedure Laterality Date     ENT SURGERY  toddler    Tosillectomy     GYN SURGERY  2007    novasure     HYSTEROSCOPY  2008    removal of ovarian cyst and endometriosis      Family Hx:  Family History   Problem Relation Age of Onset     C.A.D. Mother      balloon surgery     DIABETES Mother      Cardiovascular Mother      atrial fibrillation      Thyroid Disease Mother      CANCER Mother      Esphogeal Cancer     C.A.D. Father      bypass      DIABETES Father      C.A.D. Paternal Grandmother      DIABETES Paternal Grandmother      C.A.D. Paternal Grandfather      DIABETES Paternal Grandfather      Breast Cancer No family hx of      Cancer - colorectal No family hx of      Prostate Cancer No family hx of      Thyroid disease:  Yes, mother        DM2: Yes, Mother, father, PGM, PGF        Autoimmune: DM1, SLE, RA, Vitiligo     Social Hx:  Social History     Social History     Marital status:      Spouse name: N/A     Number of children: N/A     Years of education: N/A     Occupational History     Not on file.     Social History Main Topics     Smoking status: Never Smoker     Smokeless tobacco: Never Used     Alcohol use Yes      Comment: a couple drinks per year     Drug use: No     Sexual activity: Yes     Partners: Male     Birth control/ protection: Surgical     Other Topics Concern     Parent/Sibling W/ Cabg, Mi Or Angioplasty Before 65f 55m? No     Social History Narrative          MEDICATIONS:  has a current medication list which includes the following prescription(s): metformin, levothyroxine, atorvastatin, multi-vitamin, and viactiv.    ROS   ROS: 10 point ROS neg other than the symptoms noted above in the HPI.    Physical Exam   VS: /70 (BP Location: Left arm, Patient Position: Chair, Cuff Size: Adult  Large)  Pulse 66  Temp 97.5  F (36.4  C) (Oral)  Resp 16  Wt 86.2 kg (190 lb)  LMP 08/01/2007  SpO2 97%  Breastfeeding? No  BMI 29.1 kg/m2  GENERAL: AXOX3, NAD, well dressed, answering questions appropriately, appears stated age.  HEENT: no exophthalmos, no proptosis, EOMI, no lig lag, no retraction  NECK:  Supple, thyroid gland slightly enlarged and irregular - more prominent on the right, no distinct nodules palpable, no tenderness with palpation, no adenopathy  CV: RRR, no rubs, gallops, no murmurs  LUNGS: CTAB, no wheezes, rales, or rhonchi  ABDOMEN: soft, nontender, nondistended  EXTREMITIES: no edema, +pulses, no rashes, no lesions  NEUROLOGY: CN grossly intact, no tremors  MSK: grossly intact  SKIN: no rashes, no lesions    LABS:  TFTs:  Component    Latest Ref Rng & Units 5/13/2017   Free T3    2.3 - 4.2 pg/mL 2.3   Triiodothyronine (T3)    60 - 181 ng/dL 87   TSH    0.40 - 4.00 mU/L 1.65   T4 Free    0.76 - 1.46 ng/dL 0.88   Thyroid Peroxidase Antibody    <35 IU/mL 191 (H)   Thyroglobulin Antibody    <40 IU/mL <20     !COMPREHENSIVE Latest Ref Rng & Units 5/13/2017   SODIUM 133 - 144 mmol/L 143   POTASSIUM 3.4 - 5.3 mmol/L 4.0   CHLORIDE 94 - 109 mmol/L 110 (H)   CO2 mmol/L    BUN 7 - 30 mg/dL 15   Creatinine 0.52 - 1.04 mg/dL 0.74   Glucose 70 - 99 mg/dL 100 (H)   ANION GAP 3 - 14 mmol/L 9   CALCIUM 8.5 - 10.1 mg/dL 9.2     !COMPREHENSIVE Latest Ref Rng & Units 10/26/2010 2/6/2013 2/25/2014   Glucose 70 - 99 mg/dL 110 (H) 85 102 (H)     !COMPREHENSIVE Latest Ref Rng & Units 5/13/2017   Glucose 70 - 99 mg/dL 100 (H)     Component    Latest Ref Rng & Units 5/13/2017   Hemoglobin A1C    4.3 - 6.0 % 5.5   C-Peptide    0.9 - 6.9 ng/mL 2.6   Insulin    3 - 25 mU/L 19.9     !LIPID/HEPATIC Latest Ref Rng & Units 5/13/2017   CHOLESTEROL <200 mg/dL 157   TRIGLYCERIDES <150 mg/dL 108   HDL CHOLESTEROL >49 mg/dL 45 (L)   LDL CHOLESTEROL, CALCULATED <100 mg/dL 90   VLDL-CHOLESTEROL 0 - 30 mg/dL    NON HDL  "CHOLESTEROL <130 mg/dL 112   CHOLESTEROL/HDL RATIO 0.0 - 5.0    AST 0 - 45 U/L 11   ALT 0 - 50 U/L 37     Component    Latest Ref Rng & Units 5/13/2017   WBC    4.0 - 11.0 10e9/L 7.8   RBC Count    3.8 - 5.2 10e12/L 4.56   Hemoglobin    11.7 - 15.7 g/dL 13.4   Hematocrit    35.0 - 47.0 % 40.0   MCV    78 - 100 fl 88   MCH    26.5 - 33.0 pg 29.4   MCHC    31.5 - 36.5 g/dL 33.5   RDW    10.0 - 15.0 % 13.0   Platelet Count    150 - 450 10e9/L 300   Sed Rate    0 - 30 mm/h 9     Component      Latest Ref Rng & Units 5/13/2017   Ferritin      8 - 252 ng/mL 112     Component    Latest Ref Rng & Units 5/13/2017   Vitamin D Deficiency screening    20 - 75 ug/L 48     Component    Latest Ref Rng & Units 5/12/2017   Cortisol Free Duration Urine    h 24 HR   Volume    mL 2950 ML   Cortisol ug/g creatinine   18 years and older: Less than 32 ug/g crt   16.83   Cortisol Free Urine Random     6.90   Cortisol Free Urine     20.4   Creat/Vol     41   Creat/24hr     1210     Vital Signs 2/28/2014 5/8/2017 5/22/2017 8/17/2017   Weight (LB) 232 lb 212 lb 221 lb 190 lb   Height 5' 8\" 5' 7.75\"     BMI (Calculated) 35.35 32.54       ULTRASOUND THYROID 10/25/2007     HISTORY: Thyroid nodule.     COMPARISON: None.     FINDINGS: The right and left lobes of the thyroid gland measure 4.9 x  1.9 x 1.6 cm and 4.8 x 1.9 x 1.5 cm, respectively. Several small  ill-defined hypoechoic nodules are present within both lobes of the  thyroid gland. The largest nodule is in the upper aspect of the left  lobe measuring 1.3 x 1.1 x 0.7 cm. All the other nodules are less than  1 cm in mean diameter. No microcalcifications associated with any of  the nodules.     IMPRESSION:  1. Several small ill-defined nodules present within a normal sized  thyroid gland.  2. The largest nodule is a solid nodule in the upper aspect of the  left lobe measuring 1.3 x 1.1 x 0.7 cm.    EXAM:  US THYROID 7/10/2008     HISTORY:  Multinodular goiter.  Compare to previous US done " 10/25/07.   Stat read and fax results to [phone], pt has 9am appt on  07/11/08.      FINDINGS: The study is compared with the previous of 10/25/2007.     The right lobe measures 4.9 cm in length by 1.7 x 1.9 cm. This is  unchanged. The left lobe measures 4.7 x 1.6 x 1.7 cm and is also  unchanged. No change in the AP dimension of the isthmus at just over 2  mm in diameter.     The nodules within the right lobe are unchanged. However there is a  new solid nodule measuring 9 mm by 4 x 7 within the right isthmus. In  the left upper pole there is a 13 mm solid nodule which is stable.  Stable appearance of a cystic lesion at 7 mm at the left upper pole. A  large nodule that had been seen in the lateral left midpole and at the  lower left pole are not re demonstrated today. No other significant  Change.    ULTRASOUND THYROID 5/12/2017      HISTORY: Nontoxic single thyroid nodule.      COMPARISON: Thyroid ultrasound 7/10/2008.     FINDINGS: Thyroid ultrasound demonstrates a normal sized gland. The  right lobe measures 4.3 x 1.7 x 1.4 cm. The left lobe measures 4.3 x  1.6 x 1.7 cm. The isthmus is normal in thickness. Thyroid parenchyma  is heterogeneous in echotexture. Increased color Doppler flow is noted  throughout the gland, likely indicating underlying thyroiditis.     Thyroid nodules as follows:   Right Lobe: None.     Isthmus: None.     Left Lobe: None.         IMPRESSION: Normal-sized thyroid gland which is heterogeneous in  appearance. No discrete nodules. Increased color Doppler flow is  consistent with underlying thyroiditis.      All pertinent notes, labs, and images personally reviewed by me.     A/P  Ms.Bronwyn ISAIAH Smiley is a 52 year old here for the management of:    1. Hypothyroidism secondary to Hashimoto's.  Currently treated with levothyroxine 88 mcg/day.  Will obtain TFT's today and adjust levo dose if indicated.    Differential includes: autoimmune disease (Hashimoto's thyroiditis), treatment  for hyperthyroidism, radiation therapy, thyroid surgery, medications, congenital disease, pituitary disorder, pregnancy, and iodine deficiency.  Persons with Hashimoto's thyroiditis have serum antibodies reacting with TG, TPO, and against an unidentified protein present in colloid.     Standard treatment for hypothyroidism involves daily use of the synthetic thyroid hormone levothyroxine (Levothroid, Synthroid, others).  The dosage of thyroxine should normally be that required to bring the serum TSH level to the low normal range, such as 0.3 - 1 uU/ml. This is typically achieved with 1 ug L-T4/lb body weight/day, ranges from 75 - 125 ug/day in women, and 125 - 200 ug/day in men. Once thyroxine treatment is initiated, it is required indefinitely in most patients.     Symptoms should improve one to two weeks after starting treatment. Treatment with levothyroxine is usually lifelong.  Dosage may need to be adjusted based on body weight, medications, or pregnancy.  To determine the right dosage of levothyroxine initially we will repeat TSH and free T4 after two months.    Excessive amounts of the hormone can cause side effects, such as: Increased appetite, insomnia, heart palpitations, and shakiness.  Patients with CAD will be started on a lower dose.  Levothyroxine causes virtually no side effects when used in the appropriate dose.    Certain medications, supplements and foods can affect the body s ability to absorb levothyroxine. Medications include: Iron supplements, Cholestyramine and Calcium supplements.     Patient was advised that decreased absorption of levothyroxine might occur if taken concomitantly with food or within four hours of taking calcium, iron, soy or aluminum containing antacids. Generic substitution for brand name and vice versa, or substitution of one generic formulation for another may cause abnormal TSH levels on a previously stable dose of thyroid hormone. Pt was advised that regular monitoring  of thyroid function, as prescribed by the physician, and adherence to dose prescribed, is important.    2.  History of thyroid nodules - now resolved  Repeat thyroid ultrasound showed no nodules.    3.  IFG., + insulin resistance. Currently treated with Metformin 1000 mg/d.  Will obtain fasting glucose and insulin level today.  Would expect these measures to improve with diet, exercise, and weight loss.  Consider decreasing metformin, depending on lab results.    4.  Hyperlipidemia.  Currently treated with Atorvastatin.  Obtain fasting lipid panel, consider decreasing Atorvastatin dose depending on results.    5.  Obesity.  BMI 29.1.  She has lost a total of 42 lbs with diet and exercise efforts, 232 --> 190 lbs.  Continue diet and exercise efforts.      Labs ordered today:   Orders Placed This Encounter   Procedures     Glucose     Hemoglobin A1c     Insulin level     Lipid panel reflex to direct LDL     TSH     T4 FREE     Lipid panel reflex to direct LDL     Glucose     Insulin level     Hemoglobin A1c     TSH     T4 FREE       More than 50% of the time spent with Ms. Smiley on counseling / coordinating her care.  Total face to face time was greater than or equal to 25 minutes.      Follow-up:  follow up 12 weeks    Gena Martinez NP  Endocrinology  [facility]  CC: Other Clinic, [place]

## 2017-08-17 NOTE — MR AVS SNAPSHOT
After Visit Summary   8/17/2017    Barbara Smiley    MRN: [id number]           Patient Information     Date Of Birth          1965        Visit Information        Provider Department      8/17/2017 8:00 AM Gena Martinez APRN CNP [facility]        Today's Diagnoses     Impaired fasting glucose    -  1    Hyperlipidemia LDL goal <130        Non morbid obesity due to excess calories        Chronic lymphocytic thyroiditis           Follow-ups after your visit        Follow-up notes from your care team     Return in about 3 months (around 11/17/2017).      Who to contact     If you have questions or need follow up information about today's clinic visit or your schedule please contact [facility] directly at [phone].  Normal or non-critical lab and imaging results will be communicated to you by Prosod[employer]art, letter or phone within 4 business days after the clinic has received the results. If you do not hear from us within 7 days, please contact the clinic through Prosodichart or phone. If you have a critical or abnormal lab result, we will notify you by phone as soon as possible.  Submit refill requests through SpringCM or call your pharmacy and they will forward the refill request to us. Please allow 3 business days for your refill to be completed.          Additional Information About Your Visit        MyChart Information     [employer] gives you secure access to your electronic health record. If you see a primary care provider, you can also send messages to your care team and make appointments. If you have questions, please call your primary care clinic.  If you do not have a primary care provider, please call [phone] and they will assist you.        Care EveryWhere ID     This is your Care EveryWhere ID. This could be used by other organizations to access your [facility] medical records  [id number]        Your Vitals Were     Pulse Temperature  Respirations Last Period Pulse Oximetry Breastfeeding?    66 97.5  F (36.4  C) (Oral) 16 08/01/2007 97% No    BMI (Body Mass Index)                   29.1 kg/m2            Blood Pressure from Last 3 Encounters:   08/17/17 114/70   05/22/17 155/80   05/08/17 130/85    Weight from Last 3 Encounters:   08/17/17 86.2 kg (190 lb)   05/22/17 100.2 kg (221 lb)   05/08/17 96.2 kg (212 lb)              We Performed the Following     Glucose     Hemoglobin A1c     Insulin level     Lipid panel reflex to direct LDL     T4 FREE     TSH          Today's Medication Changes          These changes are accurate as of: 8/17/17 11:59 PM.  If you have any questions, ask your nurse or doctor.               Start taking these medicines.        Dose/Directions    levothyroxine 88 MCG tablet   Commonly known as:  SYNTHROID/LEVOTHROID   Used for:  Chronic lymphocytic thyroiditis   Replaces:  Levothyroxine Sodium 88 MCG Caps   Started by:  Gena Martinez APRN CNP        1 tab six days/week, 0.5 tab one day/week.  Total weekly dose 6.5 tabs/week.   Quantity:  90 tablet   Refills:  1         These medicines have changed or have updated prescriptions.        Dose/Directions    atorvastatin 10 MG tablet   Commonly known as:  LIPITOR   This may have changed:    - medication strength  - how much to take   Used for:  Hyperlipidemia LDL goal <130   Changed by:  Gena Martinez APRN CNP        Dose:  10 mg   Take 1 tablet (10 mg) by mouth daily   Quantity:  90 tablet   Refills:  1         Stop taking these medicines if you haven't already. Please contact your care team if you have questions.     Levothyroxine Sodium 88 MCG Caps   Replaced by:  levothyroxine 88 MCG tablet   Stopped by:  Gena Martinez APRN CNP                Where to get your medicines      These medications were sent to [facility]/pharmacy #[id number] - [place], [place] - [address]  [address] [address] [address]     Phone:  [phone]     atorvastatin 10 MG tablet     levothyroxine 88 MCG tablet    metFORMIN 500 MG tablet                Primary Care Provider    [facility] Other [facility]                Equal Access to Services     SEMAJ LUGO : Hadii marion Turpin, wacarlosda koki, felipeandrey palaciosaletheacoy sanchez. So [facility] [phone].    ATENCIÓN: Si habla español, tiene a barnett disposición servicios gratuitos de asistencia lingüística. Llame al [phone].    We comply with applicable federal civil rights laws and [place] laws. We do not discriminate on the basis of race, color, national origin, age, disability sex, sexual orientation or gender identity.            Thank you!     Thank you for choosing [facility]  for your care. Our goal is always to provide you with excellent care. Hearing back from our patients is one way we can continue to improve our services. Please take a few minutes to complete the written survey that you may receive in the mail after your visit with us. Thank you!             Your Updated Medication List - Protect others around you: Learn how to safely use, store and throw away your medicines at www.disposemymeds.org.          This list is accurate as of: 8/17/17 11:59 PM.  Always use your most recent med list.                   Brand Name Dispense Instructions for use Diagnosis    atorvastatin 10 MG tablet    LIPITOR    90 tablet    Take 1 tablet (10 mg) by mouth daily    Hyperlipidemia LDL goal <130       levothyroxine 88 MCG tablet    SYNTHROID/LEVOTHROID    90 tablet    1 tab six days/week, 0.5 tab one day/week.  Total weekly dose 6.5 tabs/week.    Chronic lymphocytic thyroiditis       metFORMIN 500 MG tablet    GLUCOPHAGE    180 tablet    Take 1 tablet (500 mg) by mouth 2 times daily (with meals)    Impaired fasting glucose       Multi-vitamin Tabs tablet   Generic drug:  multivitamin, therapeutic with minerals           VIACTIV 500-100-40 MG-UNT-MCG Chew   Generic drug:  Calcium-Vitamin  D-Vitamin K

## 2017-08-19 RX ORDER — ATORVASTATIN CALCIUM 10 MG/1
10 TABLET, FILM COATED ORAL DAILY
Qty: 90 TABLET | Refills: 1 | Status: SHIPPED | OUTPATIENT
Start: 2017-08-19

## 2017-08-19 RX ORDER — LEVOTHYROXINE SODIUM 88 UG/1
TABLET ORAL
Qty: 90 TABLET | Refills: 1 | Status: SHIPPED | OUTPATIENT
Start: 2017-08-19 | End: 2018-02-26

## 2017-08-19 NOTE — PROGRESS NOTES
Barbara,  The glucose/insulin measures have improved.   Previous glucose levels: 110, 102, 100, now 99.  The last insulin level was 19.9 and now 9.5.  My recommendation is that you stay on the Metformin at the current dose a bit longer but it's up to you.  I would like to see the fasting glucose a little lower, at least in the 80's before thinking about stopping the Metformin.  I do think you'll be able to decrease and hopefully stop it altogether at some point in the future but I feel it's providing you a benefit at this time.  If you feel strongly about stopping it sooner than later, you could try decreasing to 1000 mg per day - otherwise continue the current dose and we'll recheck again at your next visit.  Total and LDL holesterol numbers are about the same.    There has been a big improvement in your triglyceride level, was 108 now 64.  This is due to the low carb diet you have been eating.  Even at 108, it's considered normal.  If you want to try decreasing the atorvastatin, you can decrease to 10 mg/day and we'll recheck cholesterol in 3 months.  Your TSH was also a little low indicating you need a lower dose of levothyroxine.  I recommend decreasing slightly by taking one 88 mcg tablet six days per week and 1/2 tablet one day per week.  I'll update all your prescriptions.  I'll also place fasting lab orders if you want to do them before the next appointment - a couple of days is sufficient.  Please follow up in 3 months.  Let me know if you have any questions.  Gena Martinez NP  Endocrinology

## 2017-08-19 NOTE — PROGRESS NOTES
I apologize for the typo in the previous message.    I meant you could try decreasing Metformin to 500 mg per day, just wanted to note the correction.  Gena Martinez NP  Endocrinology

## 2017-11-12 ENCOUNTER — TELEPHONE (OUTPATIENT)
Dept: GENERAL RADIOLOGY | Facility: CLINIC | Age: 52
End: 2017-11-12

## 2018-02-22 ENCOUNTER — APPOINTMENT (OUTPATIENT)
Dept: CT IMAGING | Facility: CLINIC | Age: 53
End: 2018-02-22
Attending: INTERNAL MEDICINE
Payer: COMMERCIAL

## 2018-02-22 ENCOUNTER — HOSPITAL ENCOUNTER (OUTPATIENT)
Facility: CLINIC | Age: 53
Setting detail: OBSERVATION
Discharge: HOME | End: 2018-02-23
Attending: INTERNAL MEDICINE | Admitting: INTERNAL MEDICINE
Payer: COMMERCIAL

## 2018-02-22 DIAGNOSIS — R51.9 ACUTE INTRACTABLE HEADACHE, UNSPECIFIED HEADACHE TYPE: ICD-10-CM

## 2018-02-22 DIAGNOSIS — G44.219 EPISODIC TENSION-TYPE HEADACHE, NOT INTRACTABLE: Primary | ICD-10-CM

## 2018-02-22 LAB
ANION GAP SERPL CALCULATED.3IONS-SCNC: 9 MMOL/L (ref 3–14)
BASOPHILS # BLD AUTO: 0 10E9/L (ref 0–0.2)
BASOPHILS NFR BLD AUTO: 0.4 %
BUN SERPL-MCNC: 19 MG/DL (ref 7–30)
CALCIUM SERPL-MCNC: 9.3 MG/DL (ref 8.5–10.1)
CHLORIDE SERPL-SCNC: 105 MMOL/L (ref 94–109)
CO2 SERPL-SCNC: 23 MMOL/L (ref 20–32)
CREAT BLD-MCNC: 0.8 MG/DL (ref 0.52–1.04)
CREAT SERPL-MCNC: 0.74 MG/DL (ref 0.52–1.04)
CRP SERPL-MCNC: 6.3 MG/L (ref 0–8)
DIFFERENTIAL METHOD BLD: NORMAL
EOSINOPHIL # BLD AUTO: 0.1 10E9/L (ref 0–0.7)
EOSINOPHIL NFR BLD AUTO: 1.1 %
ERYTHROCYTE [DISTWIDTH] IN BLOOD BY AUTOMATED COUNT: 12.6 % (ref 10–15)
ERYTHROCYTE [SEDIMENTATION RATE] IN BLOOD BY WESTERGREN METHOD: 8 MM/H (ref 0–30)
GFR SERPL CREATININE-BSD FRML MDRD: 76 ML/MIN/1.7M2
GFR SERPL CREATININE-BSD FRML MDRD: 82 ML/MIN/1.7M2
GLUCOSE SERPL-MCNC: 114 MG/DL (ref 70–99)
HCT VFR BLD AUTO: 40.5 % (ref 35–47)
HGB BLD-MCNC: 13.7 G/DL (ref 11.7–15.7)
IMM GRANULOCYTES # BLD: 0 10E9/L (ref 0–0.4)
IMM GRANULOCYTES NFR BLD: 0.2 %
LYMPHOCYTES # BLD AUTO: 3.4 10E9/L (ref 0.8–5.3)
LYMPHOCYTES NFR BLD AUTO: 30.9 %
MCH RBC QN AUTO: 29 PG (ref 26.5–33)
MCHC RBC AUTO-ENTMCNC: 33.8 G/DL (ref 31.5–36.5)
MCV RBC AUTO: 86 FL (ref 78–100)
MONOCYTES # BLD AUTO: 0.7 10E9/L (ref 0–1.3)
MONOCYTES NFR BLD AUTO: 6.3 %
NEUTROPHILS # BLD AUTO: 6.7 10E9/L (ref 1.6–8.3)
NEUTROPHILS NFR BLD AUTO: 61.1 %
NRBC # BLD AUTO: 0 10*3/UL
NRBC BLD AUTO-RTO: 0 /100
PLATELET # BLD AUTO: 337 10E9/L (ref 150–450)
POTASSIUM SERPL-SCNC: 3.4 MMOL/L (ref 3.4–5.3)
RBC # BLD AUTO: 4.72 10E12/L (ref 3.8–5.2)
SODIUM SERPL-SCNC: 137 MMOL/L (ref 133–144)
WBC # BLD AUTO: 11 10E9/L (ref 4–11)

## 2018-02-22 PROCEDURE — 25000128 H RX IP 250 OP 636: Performed by: INTERNAL MEDICINE

## 2018-02-22 PROCEDURE — 80048 BASIC METABOLIC PNL TOTAL CA: CPT | Performed by: INTERNAL MEDICINE

## 2018-02-22 PROCEDURE — 85025 COMPLETE CBC W/AUTO DIFF WBC: CPT | Performed by: INTERNAL MEDICINE

## 2018-02-22 PROCEDURE — 96361 HYDRATE IV INFUSION ADD-ON: CPT

## 2018-02-22 PROCEDURE — 99285 EMERGENCY DEPT VISIT HI MDM: CPT | Mod: 25

## 2018-02-22 PROCEDURE — 96372 THER/PROPH/DIAG INJ SC/IM: CPT

## 2018-02-22 PROCEDURE — 86140 C-REACTIVE PROTEIN: CPT | Performed by: INTERNAL MEDICINE

## 2018-02-22 PROCEDURE — 82565 ASSAY OF CREATININE: CPT | Mod: 91

## 2018-02-22 PROCEDURE — 96375 TX/PRO/DX INJ NEW DRUG ADDON: CPT

## 2018-02-22 PROCEDURE — G0378 HOSPITAL OBSERVATION PER HR: HCPCS

## 2018-02-22 PROCEDURE — 25000128 H RX IP 250 OP 636: Performed by: PHYSICIAN ASSISTANT

## 2018-02-22 PROCEDURE — 96376 TX/PRO/DX INJ SAME DRUG ADON: CPT

## 2018-02-22 PROCEDURE — 70450 CT HEAD/BRAIN W/O DYE: CPT | Mod: XS

## 2018-02-22 PROCEDURE — 70496 CT ANGIOGRAPHY HEAD: CPT

## 2018-02-22 PROCEDURE — 25000132 ZZH RX MED GY IP 250 OP 250 PS 637: Performed by: PHYSICIAN ASSISTANT

## 2018-02-22 PROCEDURE — 99220 ZZC INITIAL OBSERVATION CARE,LEVL III: CPT | Performed by: PHYSICIAN ASSISTANT

## 2018-02-22 PROCEDURE — 85652 RBC SED RATE AUTOMATED: CPT | Performed by: INTERNAL MEDICINE

## 2018-02-22 PROCEDURE — 96374 THER/PROPH/DIAG INJ IV PUSH: CPT

## 2018-02-22 RX ORDER — SUMATRIPTAN 6 MG/.5ML
6 INJECTION, SOLUTION SUBCUTANEOUS ONCE
Status: COMPLETED | OUTPATIENT
Start: 2018-02-22 | End: 2018-02-22

## 2018-02-22 RX ORDER — IBUPROFEN 600 MG/1
600 TABLET, FILM COATED ORAL EVERY 8 HOURS
Status: DISCONTINUED | OUTPATIENT
Start: 2018-02-22 | End: 2018-02-23 | Stop reason: HOSPADM

## 2018-02-22 RX ORDER — HYDROXYZINE HYDROCHLORIDE 50 MG/1
50 TABLET, FILM COATED ORAL EVERY 6 HOURS PRN
Status: DISCONTINUED | OUTPATIENT
Start: 2018-02-22 | End: 2018-02-23 | Stop reason: HOSPADM

## 2018-02-22 RX ORDER — ONDANSETRON 2 MG/ML
4 INJECTION INTRAMUSCULAR; INTRAVENOUS EVERY 30 MIN PRN
Status: DISCONTINUED | OUTPATIENT
Start: 2018-02-22 | End: 2018-02-22

## 2018-02-22 RX ORDER — NALOXONE HYDROCHLORIDE 0.4 MG/ML
.1-.4 INJECTION, SOLUTION INTRAMUSCULAR; INTRAVENOUS; SUBCUTANEOUS
Status: DISCONTINUED | OUTPATIENT
Start: 2018-02-22 | End: 2018-02-23 | Stop reason: HOSPADM

## 2018-02-22 RX ORDER — KETOROLAC TROMETHAMINE 30 MG/ML
30 INJECTION, SOLUTION INTRAMUSCULAR; INTRAVENOUS EVERY 6 HOURS PRN
Status: DISCONTINUED | OUTPATIENT
Start: 2018-02-22 | End: 2018-02-23 | Stop reason: HOSPADM

## 2018-02-22 RX ORDER — ACETAMINOPHEN 500 MG
1000 TABLET ORAL EVERY 8 HOURS
Status: DISCONTINUED | OUTPATIENT
Start: 2018-02-22 | End: 2018-02-23 | Stop reason: HOSPADM

## 2018-02-22 RX ORDER — ONDANSETRON 2 MG/ML
4 INJECTION INTRAMUSCULAR; INTRAVENOUS EVERY 6 HOURS PRN
Status: DISCONTINUED | OUTPATIENT
Start: 2018-02-22 | End: 2018-02-23 | Stop reason: HOSPADM

## 2018-02-22 RX ORDER — ONDANSETRON 4 MG/1
4 TABLET, ORALLY DISINTEGRATING ORAL EVERY 6 HOURS PRN
Status: DISCONTINUED | OUTPATIENT
Start: 2018-02-22 | End: 2018-02-23 | Stop reason: HOSPADM

## 2018-02-22 RX ORDER — HYDROMORPHONE HYDROCHLORIDE 1 MG/ML
0.5 INJECTION, SOLUTION INTRAMUSCULAR; INTRAVENOUS; SUBCUTANEOUS
Status: COMPLETED | OUTPATIENT
Start: 2018-02-22 | End: 2018-02-22

## 2018-02-22 RX ORDER — LIDOCAINE 40 MG/G
CREAM TOPICAL
Status: DISCONTINUED | OUTPATIENT
Start: 2018-02-22 | End: 2018-02-23

## 2018-02-22 RX ORDER — HYDROXYZINE HYDROCHLORIDE 25 MG/1
25 TABLET, FILM COATED ORAL EVERY 6 HOURS PRN
Status: DISCONTINUED | OUTPATIENT
Start: 2018-02-22 | End: 2018-02-23 | Stop reason: HOSPADM

## 2018-02-22 RX ORDER — DIPHENHYDRAMINE HYDROCHLORIDE 50 MG/ML
25 INJECTION INTRAMUSCULAR; INTRAVENOUS ONCE
Status: COMPLETED | OUTPATIENT
Start: 2018-02-22 | End: 2018-02-22

## 2018-02-22 RX ORDER — SODIUM CHLORIDE 9 MG/ML
INJECTION, SOLUTION INTRAVENOUS CONTINUOUS
Status: ACTIVE | OUTPATIENT
Start: 2018-02-22 | End: 2018-02-23

## 2018-02-22 RX ORDER — IOPAMIDOL 755 MG/ML
500 INJECTION, SOLUTION INTRAVASCULAR ONCE
Status: COMPLETED | OUTPATIENT
Start: 2018-02-22 | End: 2018-02-22

## 2018-02-22 RX ORDER — METHYLPREDNISOLONE SODIUM SUCCINATE 125 MG/2ML
125 INJECTION, POWDER, LYOPHILIZED, FOR SOLUTION INTRAMUSCULAR; INTRAVENOUS ONCE
Status: COMPLETED | OUTPATIENT
Start: 2018-02-22 | End: 2018-02-22

## 2018-02-22 RX ORDER — BIOTIN 10 MG
2 TABLET ORAL DAILY
COMMUNITY

## 2018-02-22 RX ORDER — ATORVASTATIN CALCIUM 10 MG/1
10 TABLET, FILM COATED ORAL DAILY
Status: DISCONTINUED | OUTPATIENT
Start: 2018-02-23 | End: 2018-02-23 | Stop reason: HOSPADM

## 2018-02-22 RX ORDER — LEVOTHYROXINE SODIUM 88 UG/1
88 TABLET ORAL DAILY
Status: DISCONTINUED | OUTPATIENT
Start: 2018-02-23 | End: 2018-02-23 | Stop reason: HOSPADM

## 2018-02-22 RX ORDER — LORAZEPAM 2 MG/ML
0.5 INJECTION INTRAMUSCULAR ONCE
Status: COMPLETED | OUTPATIENT
Start: 2018-02-22 | End: 2018-02-22

## 2018-02-22 RX ORDER — METOCLOPRAMIDE HYDROCHLORIDE 5 MG/ML
5 INJECTION INTRAMUSCULAR; INTRAVENOUS ONCE
Status: COMPLETED | OUTPATIENT
Start: 2018-02-22 | End: 2018-02-22

## 2018-02-22 RX ADMIN — LORAZEPAM 0.5 MG: 2 INJECTION INTRAMUSCULAR; INTRAVENOUS at 16:34

## 2018-02-22 RX ADMIN — SODIUM CHLORIDE 80 ML: 9 INJECTION, SOLUTION INTRAVENOUS at 16:02

## 2018-02-22 RX ADMIN — METOCLOPRAMIDE 5 MG: 5 INJECTION, SOLUTION INTRAMUSCULAR; INTRAVENOUS at 18:50

## 2018-02-22 RX ADMIN — Medication 0.5 MG: at 18:02

## 2018-02-22 RX ADMIN — ACETAMINOPHEN 1000 MG: 500 TABLET, FILM COATED ORAL at 22:23

## 2018-02-22 RX ADMIN — IBUPROFEN 600 MG: 600 TABLET ORAL at 22:23

## 2018-02-22 RX ADMIN — DIPHENHYDRAMINE HYDROCHLORIDE 25 MG: 50 INJECTION, SOLUTION INTRAMUSCULAR; INTRAVENOUS at 18:48

## 2018-02-22 RX ADMIN — HYDROMORPHONE HYDROCHLORIDE 1 MG: 1 INJECTION, SOLUTION INTRAMUSCULAR; INTRAVENOUS; SUBCUTANEOUS at 15:23

## 2018-02-22 RX ADMIN — METHYLPREDNISOLONE SODIUM SUCCINATE 125 MG: 125 INJECTION, POWDER, FOR SOLUTION INTRAMUSCULAR; INTRAVENOUS at 17:06

## 2018-02-22 RX ADMIN — SUMATRIPTAN SUCCINATE 6 MG: 6 INJECTION SUBCUTANEOUS at 22:50

## 2018-02-22 RX ADMIN — SODIUM CHLORIDE: 9 INJECTION, SOLUTION INTRAVENOUS at 22:23

## 2018-02-22 RX ADMIN — Medication 0.5 MG: at 16:36

## 2018-02-22 RX ADMIN — IOPAMIDOL 70 ML: 755 INJECTION, SOLUTION INTRAVENOUS at 16:02

## 2018-02-22 RX ADMIN — ONDANSETRON 4 MG: 2 INJECTION INTRAMUSCULAR; INTRAVENOUS at 18:02

## 2018-02-22 RX ADMIN — Medication 0.5 MG: at 15:44

## 2018-02-22 RX ADMIN — ONDANSETRON 4 MG: 2 INJECTION INTRAMUSCULAR; INTRAVENOUS at 15:21

## 2018-02-22 ASSESSMENT — ENCOUNTER SYMPTOMS
NAUSEA: 1
COUGH: 0
HEADACHES: 1
CONFUSION: 0
VOMITING: 0
NUMBNESS: 0
FEVER: 0
RHINORRHEA: 0

## 2018-02-22 NOTE — ED NOTES
Arrives from urgent care with one week history of intractable headache, seen in urgent care twice for same, arrives alert and oriented, ABCs intact at this time.

## 2018-02-22 NOTE — ED PROVIDER NOTES
"  History     Chief Complaint:  Headache    HPI   Barbara Smiley is a 52 year old female who presents with a headache. The patient states that on 02/13/18 she experienced a sharp pain in her head without provocation that lasted for 4-5 minutes. She states on Thursday, 02/15/18, she experienced a similar sharp headache that did not dissipate, prompting her to visit Urgent Care. At this time, she was prescribed Sumatriptan, which helped with the head pain. On 02/16/18 she states that the headache resumed. The patients reports that she has been managing the pain with Sumatriptan, Tylenol, and Excedrin migraine, which helps. It was recommended that she follow up with a primary care physician and had an appointment scheduled for today but the pain worsened, prompting her to again present to urgent care where she was given Tylenol and subsequently referred here to the emergency department. Today in the ED, the patient describes intense head pain, nausea, and intermittent difficulty seeing. The patient describes the symptoms with her sight as \"needing to blink a few times before it is clear\". The patient states that today she took two Excedrin migraine and Tylenol due to the pain, and is still experiencing intense pain. She states that she walked into the ED but has had some difficulty balancing secondary to her headaches. The patient denies any vomiting, cough, runny nose, fever, confusion, numbness, and recent head injury.    Allergies:  Sulfa Drugs    Medications:    Metformin  Levothyroxine  Lipitor    Past Medical History:    Chronic lymphocytic thyroiditis  Esophageal reflux  Urinary tract infection, site not specified    Past Surgical History:    Tonsillectomy  Novasure  Removal of ovarian cyst and endometriosis    Family History:    Mother: Coronary Artery Disease, Balloon surgery, Diabetes, Atrial Fibrillation, Thyroid Disease, Esophageal Cancer  Father: Bypass, Coronary Artery Disease, Diabetes " "  Paternal Grandmother: Coronary Artery Disease, Diabetes  Paternal Grandfather: Diabetes, Coronary Artery Disease     Social History:  The patient was accompanied to the ED by her .  Smoking Status: Never Smoker  Smokeless Tobacco: Never Used  Alcohol Use: Positive  Marital Status:      Review of Systems   Constitutional: Negative for fever.   HENT: Negative for rhinorrhea.    Eyes: Positive for visual disturbance.   Respiratory: Negative for cough.    Gastrointestinal: Positive for nausea. Negative for vomiting.   Neurological: Positive for headaches. Negative for numbness.   Psychiatric/Behavioral: Negative for confusion.   All other systems reviewed and are negative.    Physical Exam     Patient Vitals for the past 24 hrs:   BP Temp Temp src Pulse Resp SpO2 Height Weight   02/22/18 2128 153/69 98  F (36.7  C) Oral 78 18 95 % 1.676 m (5' 6\") 101.6 kg (224 lb)   02/22/18 2030 141/70 - - - - 94 % - -   02/22/18 1930 144/75 - - - - 99 % - -   02/22/18 1845 148/78 - - - - 97 % - -   02/22/18 1830 137/78 - - - - 96 % - -   02/22/18 1815 132/74 - - - - 96 % - -   02/22/18 1800 150/67 - - - - - - -   02/22/18 1745 138/76 - - - - 99 % - -   02/22/18 1730 149/78 - - - - 96 % - -   02/22/18 1715 147/71 - - - - 95 % - -   02/22/18 1700 165/78 - - - - 98 % - -   02/22/18 1645 123/89 - - - - 99 % - -   02/22/18 1630 168/78 - - - - 93 % - -   02/22/18 1615 - - - - - 100 % - -   02/22/18 1545 - - - - - 100 % - -   02/22/18 1544 (!) 158/100 - - - - 100 % - -   02/22/18 1530 166/79 - - - - 100 % - -   02/22/18 1502 185/86 98.7  F (37.1  C) Temporal 88 20 100 % - -     Physical Exam   Constitutional: She is cooperative.   HENT:   Right Ear: Tympanic membrane normal.   Left Ear: Tympanic membrane normal.   Mouth/Throat: Oropharynx is clear and moist and mucous membranes are normal.   Eyes: Conjunctivae are normal.   Neck: Normal range of motion.   Cardiovascular: Regular rhythm and normal heart sounds.  "   Pulmonary/Chest: Effort normal and breath sounds normal.   Abdominal: Soft. Normal appearance and bowel sounds are normal. There is no rebound and no guarding.   Musculoskeletal: Normal range of motion.   Lymphadenopathy:     She has no cervical adenopathy.   Neurological: She is alert. She has normal strength. No cranial nerve deficit or sensory deficit.   Generalized weakness   Skin: Skin is warm and dry.   Psychiatric: She has a normal mood and affect.       Emergency Department Course     Imaging:  Radiology findings were communicated with the patient who voiced understanding of the findings.    CT Head Neck Angio w/o & w Contrast  Normal CT angiogram of the head and neck.  I agree with the preliminary report that was communicated to the  referring physician.  DAYAN ZIEGLER MD  Reading per radiology    CT Head w/o Contrast  No acute intracranial abnormality. CT angiogram to follow.  Reading per radiology    Laboratory:  Laboratory findings were communicated with the patient who voiced understanding of the findings.    Creatinine: 0.8  GFR Estimate: 76  CBC: WBC 11.0, HGB 13.7,   BMP: Glucose 114 (H) o/w WNL (Creatinine 0.74)  Erythrocyte Sedimentation Rate: 8  CRP Inflammation: 6.3    Interventions:  1521 Zofran 4 mg IV   1523 Dilaudid 1 mg IV  1544 Dilaudid 0.5 mg IV   1634 Ativan 0.4 mg IV  1636 Dilaudid 0.5 mg IV   1706 Methylprednisolone 125 mg IV  1802 Zofran 4 mg IV   1802 Dilaudid 0.5 mg IV   1848 Benadryl 25 mg IV  1850 Reglan 5 mg IV    Emergency Department Course:    1505 Nursing notes and vitals reviewed.    1510 I performed an exam of the patient as documented above.     1517 IV was inserted and blood was drawn for laboratory testing, results above.     1555 The patient was sent for CT imaging while in the emergency department, results above.      1645 I spoke with Dr. Chris Cook of neurology regarding patient's presentation, findings, and plan of care.      2039 I discussed the patient  with VIV Navarro for Dr. Ramy العلي, who will admit the patient to a monitored bed for further evaluation and treatment.    I personally reviewed the laboratory and imaging results with the patient and answered all related questions prior to admission.    Impression & Plan      Medical Decision Making:  Barbara Smiley is a 52 year old female who presents to the emergency department today with an unusually severe headache. The first headache was very sudden in onset, suggestive of subarachnoid hemorrhage. CT and CTA, however, are unremarkable. As noted, I spoke with Dr. Chris Cook of neurology. Given the time course of her symptoms, he felt that negative scans ruled out SAH with greater than 99.9% accuracy. She does not have symptoms to suggest an infectious process and inflammatory markers are not elevated. Although atypical, this likely is consistent with migraine. She has required multiple doses of medication here and still has only tenuous symptom control. As noted, I spoke with VIV Navarro, of the hospitalist service. We will admit the patient to the observation unit for further evaluation and management.      Diagnosis:    ICD-10-CM    1. Acute intractable headache, unspecified headache type R51      Disposition:   The patient is admitted into the care of Dr. Ramy العلي.    Scribe Disclosure:  I, Mckenna Villarreal, am serving as a scribe at 3:06 PM on 2/22/2018 to document services personally performed by Liz Avina MD, based on my observations and the provider's statements to me.    [facility] EMERGENCY DEPARTMENT       Liz Avina MD  02/22/18 8183

## 2018-02-22 NOTE — IP AVS SNAPSHOT
MRN:[id number]                      After Visit Summary   2/22/2018    Barbara Smiley    MRN: [id number]           Thank you!     Thank you for choosing [facility] for your care. Our goal is always to provide you with excellent care. Hearing back from our patients is one way we can continue to improve our services. Please take a few minutes to complete the written survey that you may receive in the mail after you visit. If you would like to speak to someone directly about your visit please contact Patient Relations at [phone]. Thank you!          Patient Information     Date Of Birth          1965        About your hospital stay     You were admitted on:  February 22, 2018 You last received care in the:  [facility] Observation Department    You were discharged on:  February 23, 2018        Reason for your hospital stay       You were admitted for concerns of recurrent headaches. You underwent CT and CTA of the brain that was negative. MRI of the brain was negative for any acute findings. Your inflammatory markers were negative. Your lumbar puncture did not show any signs of bacterial infection. Your viral serologies are pending and we will call you when we get the results. You were seen by the Neurologist who feel that your headaches are likely secondary to tension headache and neck strain/tightness. You have been started on gabapentin at night. Also for the next few days take an Excedrin in the morning and as needed Ibuprofen 400-600 mg up to 3 times a day. If you are having significant severe headache again, take 1 imitrex immediately, and can take another one 1 hr later if you headache has not improved (total 100 mg).                  Who to Call     For medical emergencies, please call [phone].  For non-urgent questions about your medical care, please call your primary care provider or clinic, [phone]          Attending Provider     Provider  "Specialty    FabriceLiz Renteria MD Emergency Medicine    SeverianoRamy iyer MD Internal Medicine       Primary Care Provider Office Phone # Fax #    Ijeoma GERARDO Finn [phone]      After Care Instructions     Activity       Your activity upon discharge: activity as tolerated            Diet       Follow this diet upon discharge: Orders Placed This Encounter      Advance Diet as Tolerated: Regular Diet Adult, avoid excessive caffeine                  Follow-up Appointments     Follow-up and recommended labs and tests        Follow up with primary care provider, Ijeoma Finn, within 7 days for hospital follow- up.  No follow up labs or test are needed.                  Additional Services     PHYSICAL THERAPY REFERRAL       *This therapy referral will be filtered to a centralized scheduling office at [facility] and the patient will receive a call to schedule an appointment at a [facility] location most convenient for them. *     [facility] provides Physical Therapy evaluation and treatment and many specialty services across the [facility] system.  If requesting a specialty program, please choose from the list below.    If you have not heard from the scheduling office within 2 business days, please call [phone] for all locations, with the exception of [place], please call [phone] and [facility], please call [phone]  Treatment: Evaluation & Treatment  Special Instructions/Modalities: myofascial release for cervico-genic headache  Special Programs: none    Please be aware that coverage of these services is subject to the terms and limitations of your health insurance plan.  Call member services at your health plan with any benefit or coverage questions.      **Note to Provider:  If you are referring outside of [facility] for the therapy appointment, please list the name of the location in the \"special instructions\" above, print the referral and give " "to the patient to schedule the appointment.                             Pending Results     Date and Time Order Name Status Description    2/23/2018 1355 Enterovirus PCR CSF In process     2/23/2018 1355 HSV Types 1 and 2 Qualitative PCR CSF: Tube 2 In process     2/23/2018 1355 CSF Culture Aerobic Bacterial Tube 2 In process     2/23/2018 1355 Gram stain CSF Tube 2 Preliminary             Statement of Approval     Ordered          02/23/18 1755  I have reviewed and agree with all the recommendations and orders detailed in this document.  EFFECTIVE NOW     Approved and electronically signed by:  Carlyn Perez PA-C             Admission Information     Date & Time Provider Department Dept. Phone    2/22/2018 Ramy العلي MD [facility] Observation Department [phone]      Your Vitals Were     Blood Pressure Pulse Temperature Respirations Height Weight    155/69 (BP Location: Left arm) 78 97.2  F (36.2  C) (Oral) 16 1.676 m (5' 6\") 101.6 kg (224 lb)    Last Period Pulse Oximetry BMI (Body Mass Index)             08/01/2007 96% 36.15 kg/m2         [employer] Information     [employer] gives you secure access to your electronic health record. If you see a primary care provider, you can also send messages to your care team and make appointments. If you have questions, please call your primary care clinic.  If you do not have a primary care provider, please call [phone] and they will assist you.        Care EveryWhere ID     This is your Care EveryWhere ID. This could be used by other organizations to access your [place] medical records  [id number]        Equal Access to Services     [facility] AH: Hadii aad ku hadasho Soomaali, waaxda luqadaha, qaybta kaalmada adeegyada, coy taylor. So [facility] [phone].    ATENCIÓN: Si habla español, tiene a barnett disposición servicios gratuitos de asistencia lingüística. Llame al [phone].    We comply with applicable " federal civil rights laws and [place] laws. We do not discriminate on the basis of race, color, national origin, age, disability, sex, sexual orientation, or gender identity.               Review of your medicines      START taking        Dose / Directions    gabapentin 300 MG capsule   Commonly known as:  NEURONTIN   Used for:  Episodic tension-type headache, not intractable        Dose:  300 mg   Take 1 capsule (300 mg) by mouth At Bedtime   Quantity:  90 capsule   Refills:  1         CONTINUE these medicines which may have CHANGED, or have new prescriptions. If we are uncertain of the size of tablets/capsules you have at home, strength may be listed as something that might have changed.        Dose / Directions    aspirin-acetaminophen-caffeine 250-250-65 MG per tablet   Commonly known as:  EXCEDRIN MIGRAINE   This may have changed:  how much to take        Dose:  1 tablet   Take 1 tablet by mouth every 6 hours as needed for headaches   Quantity:  80 tablet   Refills:  0       levothyroxine 88 MCG tablet   Commonly known as:  SYNTHROID/LEVOTHROID   This may have changed:    - how much to take  - how to take this  - when to take this  - additional instructions   Used for:  Chronic lymphocytic thyroiditis        1 tab six days/week, 0.5 tab one day/week.  Total weekly dose 6.5 tabs/week.   Quantity:  90 tablet   Refills:  1         CONTINUE these medicines which have NOT CHANGED        Dose / Directions    atorvastatin 10 MG tablet   Commonly known as:  LIPITOR   Used for:  Hyperlipidemia LDL goal <130        Dose:  10 mg   Take 1 tablet (10 mg) by mouth daily   Quantity:  90 tablet   Refills:  1       calcium-vitamin D-vitamin K 500-500-40 MG-UNT-MCG Chew   Commonly known as:  VIACTIV        Dose:  2 tablet   Take 2 tablets by mouth daily In the Afternoon   Refills:  0       FISH OIL ADULT GUMMIES PO        Dose:  2 chew tab   Take 2 chew tab by mouth every morning   Refills:  0       IBUPROFEN PO        Dose:   200 mg   Take 200 mg by mouth every 6 hours as needed for moderate pain   Refills:  0       IMITREX PO        Dose:  50 mg   Take 50 mg by mouth every 8 hours as needed for migraine   Refills:  0       MULTIVITAMIN ADULT Chew        Dose:  2 chew tab   Take 2 chew tab by mouth daily   Refills:  0       TURMERIC PO        Dose:  1 capsule   Take 1 capsule by mouth daily   Refills:  0            Where to get your medicines      These medications were sent to [facility]/pharmacy #[id number] - [place], [place] - [address]  [address] [facility] [address]     Phone:  [phone]     gabapentin 300 MG capsule                Protect others around you: Learn how to safely use, store and throw away your medicines at www.disposemymeds.org.             Medication List: This is a list of all your medications and when to take them. Check marks below indicate your daily home schedule. Keep this list as a reference.      Medications           Morning Afternoon Evening Bedtime As Needed    aspirin-acetaminophen-caffeine 250-250-65 MG per tablet   Commonly known as:  EXCEDRIN MIGRAINE   Take 1 tablet by mouth every 6 hours as needed for headaches                                atorvastatin 10 MG tablet   Commonly known as:  LIPITOR   Take 1 tablet (10 mg) by mouth daily   Last time this was given:  10 mg on 2/23/2018  9:20 AM   Next Dose Due:  Tomorrow am                                   calcium-vitamin D-vitamin K 500-500-40 MG-UNT-MCG Chew   Commonly known as:  VIACTIV   Take 2 tablets by mouth daily In the Afternoon   Next Dose Due:  Today afternoon                                   FISH OIL ADULT GUMMIES PO   Take 2 chew tab by mouth every morning   Next Dose Due:  Tomorrow am                                   gabapentin 300 MG capsule   Commonly known as:  NEURONTIN   Take 1 capsule (300 mg) by mouth At Bedtime                                IBUPROFEN PO   Take 200 mg by mouth every 6 hours as needed for moderate pain    Last time this was given:  600 mg on 2/23/2018  7:31 AM                                IMITREX PO   Take 50 mg by mouth every 8 hours as needed for migraine                                levothyroxine 88 MCG tablet   Commonly known as:  SYNTHROID/LEVOTHROID   1 tab six days/week, 0.5 tab one day/week.  Total weekly dose 6.5 tabs/week.   Last time this was given:  88 mcg on 2/23/2018  9:21 AM                                MULTIVITAMIN ADULT Chew   Take 2 chew tab by mouth daily                                TURMERIC PO   Take 1 capsule by mouth daily                                          More Information        Sinus Headaches     When using nasal spray, keep your chin down and angle the spray away from center.     Sinus headaches can cause a gnawing pain behind the nose and eyes. The pain most often gets worse in the afternoon and evening. You may also run a fever. Sinus headaches are caused by colds or allergies that make the nasal passages inflamed or infected.  To help prevent sinus headaches:    Treat colds promptly to keep mucus from backing up.    Avoid things that trigger sinus problems, such as pollens, dust, smoke, fumes, and strong odors.    Take allergy medicines as directed by your healthcare provider.  To relieve the pain:    Keep your sinuses open and free of mucus. Try over-the-counter sinus rinse products.    Use a nasal decongestant as directed to reduce the inflammation.    Drink fluids to keep the mucus thinner. This helps it drain more easily. You can also use a humidifier.    Apply hot packs to the area around your sinuses. Use a hot water bottle.    See your healthcare provider if your sinus headache lasts more than 2 weeks. You may need medicine for a sinus infection or an exam to check for other headache conditions, like migraines.  Date Last Reviewed: 10/1/2016    3693-9914 [employer]. [address] [place] [address], [place] [address]. All rights reserved. This information is  "not intended as a substitute for professional medical care. Always follow your healthcare professional's instructions.                Self-Care for Headaches  Most headaches aren't serious and can be relieved with self-care. But some headaches may be a sign of another health problem like eye trouble or high blood pressure. To find the best treatment, learn what kind of headaches you get. For tension headaches, self-care will usually help. To treat migraines, ask your healthcare provider for advice. It is also possible to get both tension and migraine headaches. Self-care involves relieving the pain and avoiding headache  triggers  if you can.    Ways to reduce pain and tension  Try these steps:    Apply a cold compress or ice pack to the pain site.    Drink fluids. If nausea makes it hard to drink, try sucking on ice.    Rest. Protect yourself from bright light and loud noises.    Calm your emotions by imagining a peaceful scene.    Massage tight neck, shoulder, and head muscles.    To relax muscles, soak in a hot bath or use a hot shower.  Use medicines  Aspirin or aspirin substitutes, such as ibuprofen and acetaminophen, can relieve headache. Remember: Never give aspirin to anyone 18 years old or younger because of the risk of developing Reye syndrome. Use pain medicines only when necessary.  Track your headaches  Keeping a headache diary can help you and your healthcare provider identify what's causing your headaches:    Note when each headache happens.    Identify your activities and the foods you've eaten 6 to 8 hours before the headache began.    Look for any trends or \"triggers.\"  Signs of tension headache  Any of the following can be signs:    Dull pain or feeling of pressure in a tight band around your head    Pain in your neck or shoulders    Headache without a definite beginning or end    Headache after an activity such as driving or working on a computer  Signs of migraine  Any of the following can be " "signs:    Throbbing pain on one or both sides of your head    Nausea or vomiting    Extreme sensitivity to light, sound, and smells    Bright spots, flashes, or other visual changes    Pain or nausea so severe that you can't continue your daily activities  Call your healthcare provider   If you have any of the following symptoms, contact your healthcare provider:    A headache that lingers after a recent injury or bump to the head.    A fever with a stiff neck or pain when you bend your head toward your chest.    A headache along with slurred speech, changes in your vision, or numbness or weakness in your arms or legs.    A headache for longer than 3 days.    Frequent headaches, especially in the morning.    Headaches with seizures     Seek immediate medical attention if you have a headache that you would call \"the worst headache you have ever had.\"   Date Last Reviewed: 10/4/2015    7203-6982 [employer]. [address] [place] [address]. All rights reserved. This information is not intended as a substitute for professional medical care. Always follow your healthcare professional's instructions.                 * HEADACHE [unspecified]    The cause of your headache today is not clear, but it does not appear to be the sign of any serious illness.  Under stress, some people tense the muscles of their shoulder, neck and scalp without knowing it. If this condition lasts long enough, a TENSION HEADACHE can occur.  A MIGRAINE HEADACHE is caused by changes in blood flow to the brain. It can be mild or severe. A migraine attack may be triggered by emotional stress, hormone changes during the menstrual cycle, oral contraceptives, alcohol use, certain foods containing tyramine, eye strain, weather changes, missing meals, lack of sleep or oversleeping.  Other causes of headache include a viral illness, sinus, ear or throat infection, dental pain and TMJ (jaw joint) pain.  HOME CARE:    If you were given " pain medicine for this headache, do not drive yourself home. Arrange for a ride, instead. When you get home, try to sleep. You should feel much better when you wake up.    If you are having nausea or vomiting, follow a light diet until your headache is relieved.    If you have a migraine type headache, use sunglasses when in the daylight or around bright indoor lighting until symptoms improve. Bright glaring light can worsen this kind of headache.  FOLLOW UP with your doctor if the headache is not better within the next 24 hours. If you have frequent headaches you should discuss a treatment plan with your primary care doctor. By being aware of the earliest signs of headache, and starting treatment right away, you may be able to stop the pain yourself.  GET PROMPT MEDICAL ATTENTION if any of the following occur:    Worsening of your head pain or no improvement within 24 hours    Repeated vomiting (unable to keep liquids down)    Fever over 101 F (38.3 C)    Stiff neck    Extreme drowsiness, confusion or fainting    Weakness of an arm or leg or one side of the face    Difficulty with speech or vision    5086-4755 [employer]. [address] [place] [address]. All rights reserved. This information is not intended as a substitute for professional medical care. Always follow your healthcare professional's instructions.  This information has been modified by your health care provider with permission from the publisher.

## 2018-02-22 NOTE — IP AVS SNAPSHOT
[facility] Observation Department    [address]    [facility] [address]    Phone:  [phone]                                       After Visit Summary   2/22/2018    Barbara Smiley    MRN: [id number]           After Visit Summary Signature Page     I have received my discharge instructions, and my questions have been answered. I have discussed any challenges I see with this plan with the nurse or doctor.    ..........................................................................................................................................  Patient/Patient Representative Signature      ..........................................................................................................................................  Patient Representative Print Name and Relationship to Patient    ..................................................               ................................................  Date                                            Time    ..........................................................................................................................................  Reviewed by Signature/Title    ...................................................              ..............................................  Date                                                            Time

## 2018-02-22 NOTE — ED NOTES
Pt reports she had relief of pain down to 2/10 and now pain is starting to come back.  She is assisted to walk slowly to bathroom with steady gait.

## 2018-02-22 NOTE — ED NOTES
"While hooking PT back up to vitals, PT  stated, \"Just so you know, she threw up in the bathroom.\" RN notified.   "

## 2018-02-23 ENCOUNTER — APPOINTMENT (OUTPATIENT)
Dept: GENERAL RADIOLOGY | Facility: CLINIC | Age: 53
End: 2018-02-23
Attending: PHYSICIAN ASSISTANT
Payer: COMMERCIAL

## 2018-02-23 ENCOUNTER — APPOINTMENT (OUTPATIENT)
Dept: MRI IMAGING | Facility: CLINIC | Age: 53
End: 2018-02-23
Attending: PHYSICIAN ASSISTANT
Payer: COMMERCIAL

## 2018-02-23 VITALS
RESPIRATION RATE: 16 BRPM | HEART RATE: 79 BPM | BODY MASS INDEX: 36 KG/M2 | HEIGHT: 66 IN | SYSTOLIC BLOOD PRESSURE: 135 MMHG | DIASTOLIC BLOOD PRESSURE: 47 MMHG | TEMPERATURE: 96.8 F | OXYGEN SATURATION: 95 % | WEIGHT: 224 LBS

## 2018-02-23 PROBLEM — R51.9 HEADACHE: Status: RESOLVED | Noted: 2018-02-22 | Resolved: 2018-02-23

## 2018-02-23 LAB
ANION GAP SERPL CALCULATED.3IONS-SCNC: 6 MMOL/L (ref 3–14)
APPEARANCE CSF: CLEAR
BASOPHILS # BLD AUTO: 0 10E9/L (ref 0–0.2)
BASOPHILS NFR BLD AUTO: 0.2 %
BUN SERPL-MCNC: 14 MG/DL (ref 7–30)
CALCIUM SERPL-MCNC: 8.8 MG/DL (ref 8.5–10.1)
CHLORIDE SERPL-SCNC: 106 MMOL/L (ref 94–109)
CO2 SERPL-SCNC: 25 MMOL/L (ref 20–32)
COLOR CSF: COLORLESS
CREAT SERPL-MCNC: 0.64 MG/DL (ref 0.52–1.04)
CRP SERPL-MCNC: 4.4 MG/L (ref 0–8)
DIFFERENTIAL METHOD BLD: ABNORMAL
EOSINOPHIL # BLD AUTO: 0 10E9/L (ref 0–0.7)
EOSINOPHIL NFR BLD AUTO: 0 %
ERYTHROCYTE [DISTWIDTH] IN BLOOD BY AUTOMATED COUNT: 12.7 % (ref 10–15)
ERYTHROCYTE [SEDIMENTATION RATE] IN BLOOD BY WESTERGREN METHOD: 7 MM/H (ref 0–30)
GFR SERPL CREATININE-BSD FRML MDRD: >90 ML/MIN/1.7M2
GLUCOSE CSF-MCNC: 80 MG/DL (ref 40–70)
GLUCOSE SERPL-MCNC: 162 MG/DL (ref 70–99)
GRAM STN SPEC: NORMAL
HCT VFR BLD AUTO: 39.4 % (ref 35–47)
HGB BLD-MCNC: 13.2 G/DL (ref 11.7–15.7)
IMM GRANULOCYTES # BLD: 0.1 10E9/L (ref 0–0.4)
IMM GRANULOCYTES NFR BLD: 0.5 %
LYMPHOCYTES # BLD AUTO: 1.7 10E9/L (ref 0.8–5.3)
LYMPHOCYTES NFR BLD AUTO: 13.7 %
MCH RBC QN AUTO: 29 PG (ref 26.5–33)
MCHC RBC AUTO-ENTMCNC: 33.5 G/DL (ref 31.5–36.5)
MCV RBC AUTO: 87 FL (ref 78–100)
MONOCYTES # BLD AUTO: 0.2 10E9/L (ref 0–1.3)
MONOCYTES NFR BLD AUTO: 1.6 %
NEUTROPHILS # BLD AUTO: 10.3 10E9/L (ref 1.6–8.3)
NEUTROPHILS NFR BLD AUTO: 84 %
NRBC # BLD AUTO: 0 10*3/UL
NRBC BLD AUTO-RTO: 0 /100
PLATELET # BLD AUTO: 311 10E9/L (ref 150–450)
POTASSIUM SERPL-SCNC: 4.2 MMOL/L (ref 3.4–5.3)
PROT CSF-MCNC: 42 MG/DL (ref 15–60)
RBC # BLD AUTO: 4.55 10E12/L (ref 3.8–5.2)
RBC # CSF MANUAL: 2 /UL (ref 0–2)
SODIUM SERPL-SCNC: 137 MMOL/L (ref 133–144)
SPECIMEN SOURCE: NORMAL
TUBE # CSF: 4 #
WBC # BLD AUTO: 12.2 10E9/L (ref 4–11)
WBC # CSF MANUAL: 1 /UL (ref 0–5)

## 2018-02-23 PROCEDURE — 86140 C-REACTIVE PROTEIN: CPT | Performed by: PHYSICIAN ASSISTANT

## 2018-02-23 PROCEDURE — 85652 RBC SED RATE AUTOMATED: CPT | Performed by: PHYSICIAN ASSISTANT

## 2018-02-23 PROCEDURE — 96361 HYDRATE IV INFUSION ADD-ON: CPT

## 2018-02-23 PROCEDURE — 84157 ASSAY OF PROTEIN OTHER: CPT | Performed by: PHYSICIAN ASSISTANT

## 2018-02-23 PROCEDURE — G0378 HOSPITAL OBSERVATION PER HR: HCPCS

## 2018-02-23 PROCEDURE — 25000132 ZZH RX MED GY IP 250 OP 250 PS 637: Performed by: PHYSICIAN ASSISTANT

## 2018-02-23 PROCEDURE — 87070 CULTURE OTHR SPECIMN AEROBIC: CPT | Performed by: PHYSICIAN ASSISTANT

## 2018-02-23 PROCEDURE — 36415 COLL VENOUS BLD VENIPUNCTURE: CPT | Performed by: PHYSICIAN ASSISTANT

## 2018-02-23 PROCEDURE — 62270 DX LMBR SPI PNXR: CPT

## 2018-02-23 PROCEDURE — 89050 BODY FLUID CELL COUNT: CPT | Performed by: PHYSICIAN ASSISTANT

## 2018-02-23 PROCEDURE — 96375 TX/PRO/DX INJ NEW DRUG ADDON: CPT

## 2018-02-23 PROCEDURE — 25000125 ZZHC RX 250: Performed by: INTERNAL MEDICINE

## 2018-02-23 PROCEDURE — 87498 ENTEROVIRUS PROBE&REVRS TRNS: CPT | Performed by: PHYSICIAN ASSISTANT

## 2018-02-23 PROCEDURE — 80048 BASIC METABOLIC PNL TOTAL CA: CPT | Performed by: PHYSICIAN ASSISTANT

## 2018-02-23 PROCEDURE — 70553 MRI BRAIN STEM W/O & W/DYE: CPT

## 2018-02-23 PROCEDURE — 25000128 H RX IP 250 OP 636: Performed by: RADIOLOGY

## 2018-02-23 PROCEDURE — A9585 GADOBUTROL INJECTION: HCPCS | Performed by: RADIOLOGY

## 2018-02-23 PROCEDURE — 87205 SMEAR GRAM STAIN: CPT | Performed by: PHYSICIAN ASSISTANT

## 2018-02-23 PROCEDURE — 25000128 H RX IP 250 OP 636: Performed by: PHYSICIAN ASSISTANT

## 2018-02-23 PROCEDURE — 82945 GLUCOSE OTHER FLUID: CPT | Performed by: PHYSICIAN ASSISTANT

## 2018-02-23 PROCEDURE — 87529 HSV DNA AMP PROBE: CPT | Performed by: PHYSICIAN ASSISTANT

## 2018-02-23 PROCEDURE — 85025 COMPLETE CBC W/AUTO DIFF WBC: CPT | Performed by: PHYSICIAN ASSISTANT

## 2018-02-23 PROCEDURE — 99217 ZZC OBSERVATION CARE DISCHARGE: CPT | Performed by: PHYSICIAN ASSISTANT

## 2018-02-23 RX ORDER — GADOBUTROL 604.72 MG/ML
10 INJECTION INTRAVENOUS ONCE
Status: COMPLETED | OUTPATIENT
Start: 2018-02-23 | End: 2018-02-23

## 2018-02-23 RX ORDER — SUMATRIPTAN 50 MG/1
50 TABLET, FILM COATED ORAL
Qty: 30 TABLET | Status: CANCELLED | COMMUNITY
Start: 2018-02-23

## 2018-02-23 RX ORDER — GABAPENTIN 300 MG/1
300 CAPSULE ORAL AT BEDTIME
Qty: 90 CAPSULE | Refills: 1 | Status: ON HOLD | OUTPATIENT
Start: 2018-02-23 | End: 2018-02-27

## 2018-02-23 RX ORDER — LIDOCAINE HYDROCHLORIDE 10 MG/ML
5 INJECTION, SOLUTION EPIDURAL; INFILTRATION; INTRACAUDAL; PERINEURAL ONCE
Status: COMPLETED | OUTPATIENT
Start: 2018-02-23 | End: 2018-02-23

## 2018-02-23 RX ADMIN — IBUPROFEN 600 MG: 600 TABLET ORAL at 07:31

## 2018-02-23 RX ADMIN — GADOBUTROL 10 ML: 604.72 INJECTION INTRAVENOUS at 10:08

## 2018-02-23 RX ADMIN — KETOROLAC TROMETHAMINE 30 MG: 30 INJECTION, SOLUTION INTRAMUSCULAR at 14:03

## 2018-02-23 RX ADMIN — ACETAMINOPHEN 1000 MG: 500 TABLET, FILM COATED ORAL at 07:31

## 2018-02-23 RX ADMIN — LIDOCAINE HYDROCHLORIDE 5 ML: 10 INJECTION, SOLUTION EPIDURAL; INFILTRATION; INTRACAUDAL; PERINEURAL at 14:54

## 2018-02-23 RX ADMIN — ATORVASTATIN CALCIUM 10 MG: 10 TABLET, FILM COATED ORAL at 09:20

## 2018-02-23 RX ADMIN — ACETAMINOPHEN 1000 MG: 500 TABLET, FILM COATED ORAL at 15:16

## 2018-02-23 RX ADMIN — LEVOTHYROXINE SODIUM 88 MCG: 88 TABLET ORAL at 09:21

## 2018-02-23 ASSESSMENT — PAIN DESCRIPTION - DESCRIPTORS: DESCRIPTORS: DULL;HEADACHE

## 2018-02-23 NOTE — PHARMACY-ADMISSION MEDICATION HISTORY
Admission medication history interview status for this patient is complete. See [facility] admission navigator for allergy information, prior to admission medications and immunization status.     Medication history interview source(s):Patient  Medication history resources (including written lists, pill bottles, clinic record):Epic    Changes made to PTA medication list:  Added: Fish oil, Turmeric  Deleted: Metformin  Changed: Multivitamins, Viactiv    Medication reconciliation/reorder completed by provider prior to medication history? No    Do you take OTC medications (eg tylenol, ibuprofen, fish oil, eye/ear drops, etc)? yes    For patients on insulin therapy: N    Prior to Admission medications    Medication Sig Last Dose Taking? Auth Provider   calcium-vitamin D-vitamin K (VIACTIV) 500-500-40 MG-UNT-MCG CHEW Take 2 tablets by mouth daily In the Afternoon 2/21/2018 at Unknown time Yes Unknown, Entered By History   Multiple Vitamins-Minerals (MULTIVITAMIN ADULT) CHEW Take 2 chew tab by mouth daily 2/22/2018 at am Yes Unknown, Entered By History   Omega-3 Fatty Acids (FISH OIL ADULT GUMMIES PO) Take 2 chew tab by mouth every morning 2/22/2018 at am Yes Unknown, Entered By History   TURMERIC PO Take 1 capsule by mouth daily 2/22/2018 at Unknown time Yes Unknown, Entered By History   levothyroxine (SYNTHROID/LEVOTHROID) 88 MCG tablet 1 tab six days/week, 0.5 tab one day/week.  Total weekly dose 6.5 tabs/week.  Patient taking differently: Take 88 mcg by mouth daily  2/22/2018 at am Yes Gena Martinez APRN CNP   atorvastatin (LIPITOR) 10 MG tablet Take 1 tablet (10 mg) by mouth daily 2/22/2018 at am Yes Gena Martinez APRN CNP

## 2018-02-23 NOTE — PROGRESS NOTES
Hosp short note    Hx reviewed. New onset severe frontal HA for last 1-1.5 week. No hx of HA in the past. Seen by UC in the past and initially responded to Imitrex and pain meds but now meds not helping. No obvious triggers. Mild nausea but no vomiting.  CT head and CTA negative.   Sxs better today after scheduled tylenol, ibuprofen, had imitrex, steroids and reglan in ED.   D/w Dr. العلي, given new onset HA, and increasing frequency and severity, will get MRI of the brain and get Neuro eval.   Dispo pending above results.

## 2018-02-23 NOTE — PLAN OF CARE
Problem: Patient Care Overview  Goal: Plan of Care/Patient Progress Review  Outcome: Improving  PRIMARY DIAGNOSIS: ACUTE PAIN - headache  OUTPATIENT/OBSERVATION GOALS TO BE MET BEFORE DISCHARGE:  1. Pain Status: Improved-controlled with oral pain medications.    2. Return to near baseline physical activity: Yes    3. Cleared for discharge by consultants (if involved): N/A    Discharge Planner Nurse   Safe discharge environment identified: Yes  Barriers to discharge: Yes       Entered by: Katherine Shafer 02/23/2018 2:23 AM      A&Ox4. Vitals stable. Denies chest pain or SOB. Denies headache or nausea. States that she feel a lot better after pain meds and ice pack to head. Tolerating clear liquids, will advance diet for breakfast. Ambulates to bathroom with SBA. Will continue to monitor and provide comfort care.    Please review provider order for any additional goals.   Nurse to notify provider when observation goals have been met and patient is ready for discharge.

## 2018-02-23 NOTE — PROGRESS NOTES
Problem: Patient Care Overview  Goal: Plan of Care/Patient Progress Review  Outcome: Improving  PRIMARY DIAGNOSIS: ACUTE PAIN - headache  OUTPATIENT/OBSERVATION GOALS TO BE MET BEFORE DISCHARGE:  1. Pain Status: Improved-controlled with oral pain medications.      2. Return to near baseline physical activity: Yes      3. Cleared for discharge by consultants (if involved): Yes      Discharge Planner Nurse   Safe discharge environment identified: Yes  Barriers to discharge: Yes     A&Ox4. Vitals stable. Denies chest pain or SOB. Denies headache or nausea. Tolerating regular diet well. Head MRI negative for intracranial hemorrhage or focal mass lesions. Plan for neurology consult.

## 2018-02-23 NOTE — PLAN OF CARE
Problem: Patient Care Overview  Goal: Plan of Care/Patient Progress Review  Outcome: No Change  A&Ox4. Vitals stable. Denies chest pain or SOB. Denies headache, dizziness or nausea. Pt has been sleeping most of shift. Tolerating clear liquids, will advance diet for breakfast. Ambulates to bathroom with SBA. NS at 100 infusing. Will continue to monitor and provide comfort care.

## 2018-02-23 NOTE — ED NOTES
Pt became nauseated and pain worsened when she was up to bathroom.  She vomited while in bathroom.

## 2018-02-23 NOTE — PROGRESS NOTES
Headache over past 10 dayson and off  Mri brain ct brain cta nl  Sed rate nl  Possible cervicogenic headache  However  Elevated white count today   Will review w primary if no known source  Recommend LP for viral meningitis ,will need HSV PCR

## 2018-02-23 NOTE — PROGRESS NOTES
Problem: Patient Care Overview  Goal: Plan of Care/Patient Progress Review  Outcome: Improving  PRIMARY DIAGNOSIS: ACUTE PAIN - headache  OUTPATIENT/OBSERVATION GOALS TO BE MET BEFORE DISCHARGE:  1. Pain Status: Improved-controlled with oral pain medications.     2. Return to near baseline physical activity: Yes     3. Cleared for discharge by consultants (if involved): Yes     Discharge Planner Nurse   Safe discharge environment identified: Yes  Barriers to discharge: Yes      A&Ox4. Vitals stable. Denies chest pain or SOB. Denies headache or nausea. Tolerating regular diet well.

## 2018-02-23 NOTE — CONSULTS
Consult Date:  02/23/2018      HISTORY OF PRESENT ILLNESS:  The patient is a 52-year-old woman who we were asked to see because of recurrent headaches.      She tells me that currently her headache is mild to moderate.  She tells me that it is a pulsating and throbbing sensation throughout her head and bilaterally in her temples.  No nausea, no vomiting, no sensory or motor symptoms.  She has had a sensation of some blurriness in her vision occasionally.      She tells me that her headache issues started approximately 10 days ago.  She had the abrupt onset of a diffuse generalized headache without any specific trigger.  It was bad enough that she went to the urgent care.  She tells me that she received symptomatic treatment and then that got better.  She was discharged.  The headache stayed away for approximately 20 hours.  Then it came back again.  She used the medication that she was given including sumatriptan, Tylenol and Excedrin Migraine, which was modestly helpful.  Today, her headache again came back and was severe,  which brought her to the emergency room.  She had some nausea.  Blurriness of vision.  No sensory motor issues.      She tells me that she has not had any fever or chills.  Has not had anybody sick at home with any febrile illness.  She has had no recent falls or concussions.  She has no family history of aneurysms.      She has had no significant headache history in the past; maybe one mild headache every couple of months or so.  She has no personal family history of migraines.      She has a history of Hashimoto's disease, but no history of collagen vascular issues.  No arthralgias or rashes.      She has had labs, which were notable for normal electrolytes and chemistry panel.  Her CRP is normal.  Her WBC count on 12/22 was normal.  Elevated today on 02/23 at 12.2  with 10.3 neutrophils.  She had a CT of the brain which was unremarkable and a CTA of head and neck which was normal.  She had an  MRI of the brain with and without contrast on 02/23, with a few tiny nonspecific white matter lesions.  Nonenhancing.  Otherwise unremarkable.        CURRENT MEDICATIONS:  Tylenol, Lipitor, Advil, Motrin, Synthroid.  On 02/22 she had received Dilaudid and Benadryl in the afternoon.  Ativan, Solu-Medrol 125 mg, Reglan injection and Imitrex at 2200 hours last night.  She received Dilaudid injections on 02/22 x3 in the afternoon.      PAST MEDICAL HISTORY:   1.  Notable for what she refers to as Hashimoto thyroiditis   2.  Esophageal reflux.   3.  Urinary tract infection.     4.  She is status post tonsillectomy.   5.  NovaSure and removal of ovarian cyst and endometriosis.      FAMILY HISTORY:  Positive for diabetes, coronary artery disease, thyroid disease and esophageal cancer.      SOCIAL HISTORY:  The patient is a teacher.  Does not smoke.  Rare alcohol use.      ALLERGIES:  SULFA DRUGS.      PHYSICAL EXAMINATION:   GENERAL:  She is pleasant, alert and cooperative.  She is awake, alert and oriented x3.  Insight, current knowledge, speech and language are normal.  Cognitively intact.     HEENT: Pupils are equal and equally reactive.  Extraocular movements are full without nystagmus.  Face is symmetrical.  Uvula rises in the midline.  The tongue protrudes symmetrically.     EXTREMITIES: She has good strength in both upper and both lower extremities.  Finger-to-nose is normal.  Reflexes are +1, equal and symmetric in both upper and both lower extremities and toes are downgoing.      She has mild decreased range of motion in the cervical spine in lateral rotation.  She has mild trigger points in the upper extremities.  Moderate to severe tightness in the levator scapula and trapezii bilaterally.   SKIN AND JOINTS:  Unremarkable.   Tone is equal and symmetric.   Coordination is normal.  She was not stood up.      IMPRESSION:  This is a 52-year-old woman who is being evaluated for a headache which has been diffuse  bilateral, some throbbing quality to it over the past 10 days, with temporary improvement with symptomatic treatment in the emergency room.      The differential diagnosis is one of possible cervicogenic headache in this patient with severe myofacial restrictions noted in her upper body segments.      I discussed the presence of her elevated white blood cell count today.  This can be seen in the aftermath of her steroid injection.  However, in this context as some herpes meningitis can be subacute in their presentation, I would recommend a spinal fluid examination.      The differential diagnosis of a more benign viral meningitis can also be considered.      At this time, I reassured the patient that we do not have any evidence of intracranial aneurysm or arteritis.      She is in an age group where occasionally migraine phenomena can become prominent and a cervicogenic component can be a trigger for this as well.  I discussed this with the patient.      In this context, I would recommend treating her with gabapentin.  At doses of 100 mg tablets 2 p.o. every evening to be increased to 3 if tolerated.      Side effects of the medications were discussed with the patient.      We will track the results of the spinal fluid results.  If negative, she can go home on gabapentin.  If abnormal, will address as appropriate, including ID consultation.         MARTÍNEZ PAZ MD             D: 2018   T: 2018   MT: MD      Name:     MARICRUZ LOWERY   MRN:      [id number]        Account:       [id number]   :      1965           Consult Date:  2018      Document: [id number]

## 2018-02-23 NOTE — DISCHARGE SUMMARY
[facility]  Outpatient/Observation Unit  Discharge Summary        Patient ID:  Barbara Smiley  [id number]  52 year old  1965    Admit date: 2/22/2018    Discharge date and time: 2/23/2018     Admitting Provider: Ramy العلي MD    Discharge Provider: Carlyn Perez PADorothyC    Admission Diagnoses:  Acute intractable headache, unspecified headache type [R51]    Discharge Diagnoses: recurrent headache likely cervicogenic     Admission Condition: good    Discharged Condition: good    Hospital Course:    Reason for your hospital stay       You were admitted for concerns of recurrent headaches. You underwent CT   and CTA of the brain that was negative. MRI of the brain was negative for   any acute findings. Your inflammatory markers were negative. Your lumbar   puncture did not show any signs of bacterial infection. Your viral   serologies are pending and we will call you when we get the results. You   were seen by the Neurologist who feel that your headaches are likely   secondary to tension headache and neck strain/tightness. You have been   started on gabapentin at night. Also for the next few days take an   Excedrin in the morning and as needed Ibuprofen 400-600 mg up to 3 times a   day. If you are having significant severe headache again, take 1 imitrex   immediately, and can take another one 1 hr later if you headache has not   improved (total 100 mg).        Barbara Smiley is a 52 year old female with a PMH significant for HLD, hypothyroidism secondary to Hashimoto thyroiditis, who presents for evaluation of headache. Workup in the ED reveals VSS. BMP and CBC w/ diff unremarkable. CRP 6.3. CT head w/o contrast and CT angiogram of head and neck are negative for acute pathology. Patient received 1 L IV NS, 2.5 mg IV dilaudid, 4 mg IV zofran, 5 mg IV reglan, 0.5 mg IV ativan, 25 mg IV benadryl, 125 mg IV solumedrol in the ED.     1. Intractable headache: headache resolved  after above intervention in the ED. MRI obtained was negative for any acute pathology. ESR/CRP also negative for inflammatory process. Neurology saw pt and recommended LP to r/o bacterial meningitis (though very low suspicion).Neuro felt that her sxs is likely 2/2 cervicogenic and recommended gabapentin as well as outpt myofascial release. Her sxs improved with scheduled tylenol and ibuprofen. She will be discharged with gabapentin and outpt PT referral.      2. HLD: Continue PTA atorvastatin 10 mg daily.     3. Hypothyroidism secondary to Hashimoto thyroiditis: Diagnosed in 2007. Follows with endocrinologist Dr. Jose. Started on levothyroxine and FNA biopsy was reportedly benign.Stablely treated with levothyroxine 88 mcg daily.     Consults: neurology    Significant Diagnostic Studies:     Recent Labs  Lab 02/23/18  0524 02/22/18  1512   WBC 12.2* 11.0   HGB 13.2 13.7   HCT 39.4 40.5   MCV 87 86    337       Recent Labs  Lab 02/23/18  0524 02/22/18  1517 02/22/18  1512     --  137   POTASSIUM 4.2  --  3.4   CHLORIDE 106  --  105   CO2 25  --  23   ANIONGAP 6  --  9   *  --  114*   BUN 14  --  19   CR 0.64  --  0.74   GFRESTIMATED >90 76 82   GFRESTBLACK >90 >90 >90   ZECHARIAH 8.8  --  9.3       Recent Labs  Lab 02/23/18  1445   CULT Culture negative monitoring continues       Recent Labs  Lab 02/23/18  0524 02/22/18  1512   SED 7 8   CRP 4.4 6.3       Results for orders placed or performed during the hospital encounter of 02/22/18   CT Head w/o Contrast    Narrative    CT HEAD WITHOUT CONTRAST  2/22/2018 4:10 PM    HISTORY: Headache.     TECHNIQUE: Scans were obtained through the head without IV contrast.   Radiation dose for this scan was reduced using automated exposure  control, adjustment of the mA and/or kV according to patient size, or  iterative reconstruction technique.    COMPARISON: MR 6/2/2008.    FINDINGS: The ventricles are normal in size and position. There are  several extra-axial  small calcifications along the tentorial surface  without mass effect. They are likely not of any acute clinical  significance.  No hemorrhage, mass lesion, or focal area of acute  infarction identified. Paranasal sinuses are normal. No bony  abnormality.       Impression    IMPRESSION: No acute intracranial abnormality. CT angiogram to follow.      NICKIE HINDS MD   CT Head Neck Angio w/o & w Contrast    Narrative    CT ANGIOGRAM OF THE HEAD AND NECK WITHOUT AND WITH CONTRAST  2/22/2018  4:11 PM     HISTORY: Intense headache, nausea and intermittent difficulty in  seeing today. Balance difficulty.    TECHNIQUE: Precontrast localizing scans were followed by CT  angiography with an injection of 70mL [id number]-370 IV with scans through  the head and neck. Images were transferred to a separate 3-D  workstation where multiplanar reformations and 3-D images were  created. Estimates of carotid stenoses are made relative to the distal  internal carotid artery diameters except as noted. Radiation dose for  this scan was reduced using automated exposure control, adjustment of  the mA and/or kV according to patient size, or iterative  reconstruction technique.    COMPARISON: MRI brain 6/2/2008    CT HEAD FINDINGS: No contrast enhancing lesions. Cerebral blood flow  is grossly normal.    CT ANGIOGRAM HEAD FINDINGS: Arteries are widely patent with no  aneurysm, significant stenosis, occlusion or intraarterial thrombus.  Venous circulation is unremarkable.     CT ANGIOGRAM NECK FINDINGS:   Right carotid artery: No significant stenosis.      Left carotid artery: No significant stenosis.      Vertebral arteries: No significant stenosis.      Other findings: Cervical spine degenerative changes.      Impression    IMPRESSION: Normal CT angiogram of the head and neck.    I agree with the preliminary report that was communicated to the  referring physician.    DAYAN ZIEGLER MD   MR Brain w/o & w Contrast    Narrative    MRI BRAIN  WITHOUT AND WITH CONTRAST  2/23/2018 10:15 AM    HISTORY:  Severe new onset headache.    TECHNIQUE:  Multiplanar, multisequence MRI of the brain without and  with 10 mL Gadavist.    COMPARISON: None.    FINDINGS: Diffusion-weighted images are normal. There is no evidence  for intracranial hemorrhage or acute infarct. Ventricles and  subarachnoid spaces are within normal limits. There are few scattered  signal hyperintensities in the supratentorial white matter which are  primarily in subcortical white matter. These are not associated with  any mass effect or any enhancement. Postcontrast images do not show  any abnormal areas of enhancement or any focal mass lesions.      Impression    IMPRESSION:  1. Few tiny nonspecific white matter lesions.  2. No evidence for intracranial hemorrhage or any acute process.    LAZARO LAU MD   XR Lumbar Puncture Spinal Tap Diag    Narrative    LUMBAR PUNCTURE WITH FLUOROSCOPIC GUIDANCE 2/23/2018 2:53 PM     HISTORY: Headache.    PROCEDURE:  Informed consent was obtained for the procedure with  discussion including possible risks of bleeding, infection, and spinal  headache.    FLUOROSCOPY TIME: 0.3 minutes.   SPOT IMAGES OR CINE RUNS: 1    Using 3 mL 1% lidocaine for local anesthesia and sterile technique, I  placed a 22 gauge spinal needle in the lumbar thecal sac.    I aspirated 12 mL of clear cerebrospinal fluid and sent it for the  laboratory studies that were ordered.  There were no complications.    DREAD MCDERMOTT DO       Treatments: IV hydration, scheduled tylenol ibuprofen and imitrex    Discharge Exam:    B/P: 155/69, T: 97.2, P: 78, R: 16  GENERAL:  Comfortable.  PSYCH: pleasant, oriented, No acute distress.  HEENT:  PERRLA. Normal conjunctiva, normal hearing, nasal mucosa and Oropharynx are normal.  NECK:  Supple, no neck vein distention, adenopathy or bruits, normal thyroid.  HEART:  Normal S1, S2 with no murmur, no pericardial rub, gallops or S3 or S4.  LUNGS:   Clear to auscultation, normal Respiratory effort. No wheezing, rales or ronchi.  ABDOMEN:  Soft, no hepatosplenomegaly, normal bowel sounds. Non-tender, non distended.   EXTREMITIES:  No pedal edema, +2 pulses bilateral and equal.  SKIN:  Dry to touch, No rash, wound or ulcerations.  NEUROLOGIC:  CN 2-12 intact, BL 5/5 symmetric upper and lower extremity strength, sensation is intact with no focal deficits.       Pending Studies:    Unresulted Labs Ordered in the Past 30 Days of this Admission     Date and Time Order Name Status Description    2/23/2018 1355 Enterovirus PCR CSF In process     2/23/2018 1355 HSV Types 1 and 2 Qualitative PCR CSF: Tube 2 In process     2/23/2018 1355 CSF Culture Aerobic Bacterial Tube 2 In process     2/23/2018 1355 Gram stain CSF Tube 2 Preliminary            Disposition: home    Patient Instructions:        Review of your medicines      START taking       Dose / Directions    gabapentin 300 MG capsule   Commonly known as:  NEURONTIN   Used for:  Episodic tension-type headache, not intractable        Dose:  300 mg   Take 1 capsule (300 mg) by mouth At Bedtime   Quantity:  90 capsule   Refills:  1         CONTINUE these medicines which may have CHANGED, or have new prescriptions. If we are uncertain of the size of tablets/capsules you have at home, strength may be listed as something that might have changed.       Dose / Directions    aspirin-acetaminophen-caffeine 250-250-65 MG per tablet   Commonly known as:  EXCEDRIN MIGRAINE   This may have changed:  how much to take        Dose:  1 tablet   Take 1 tablet by mouth every 6 hours as needed for headaches   Quantity:  80 tablet   Refills:  0       levothyroxine 88 MCG tablet   Commonly known as:  SYNTHROID/LEVOTHROID   This may have changed:    - how much to take  - how to take this  - when to take this  - additional instructions   Used for:  Chronic lymphocytic thyroiditis        1 tab six days/week, 0.5 tab one day/week.  Total  weekly dose 6.5 tabs/week.   Quantity:  90 tablet   Refills:  1         CONTINUE these medicines which have NOT CHANGED       Dose / Directions    atorvastatin 10 MG tablet   Commonly known as:  LIPITOR   Used for:  Hyperlipidemia LDL goal <130        Dose:  10 mg   Take 1 tablet (10 mg) by mouth daily   Quantity:  90 tablet   Refills:  1       calcium-vitamin D-vitamin K 500-500-40 MG-UNT-MCG Chew   Commonly known as:  VIACTIV        Dose:  2 tablet   Take 2 tablets by mouth daily In the Afternoon   Refills:  0       FISH OIL ADULT GUMMIES PO        Dose:  2 chew tab   Take 2 chew tab by mouth every morning   Refills:  0       IBUPROFEN PO        Dose:  200 mg   Take 200 mg by mouth every 6 hours as needed for moderate pain   Refills:  0       IMITREX PO        Dose:  50 mg   Take 50 mg by mouth every 8 hours as needed for migraine   Refills:  0       MULTIVITAMIN ADULT Chew        Dose:  2 chew tab   Take 2 chew tab by mouth daily   Refills:  0       TURMERIC PO        Dose:  1 capsule   Take 1 capsule by mouth daily   Refills:  0            Where to get your medicines      These medications were sent to [facility]/pharmacy #[id number] - [place], [place] - [address]  [address] [address] [address]     Phone:  [phone]      gabapentin 300 MG capsule                 Activity: activity as tolerated    Active Diet Order      Advance Diet as Tolerated: Regular Diet Adult      Diet    Follow-up with PCP in 1 week.    Signed:  Carlyn Perez

## 2018-02-23 NOTE — H&P
[facility]    Hospitalist History and Physical    Name: Barbara Smiley    MRN: [id number]  YOB: 1965    Age: 52 year old  Date of Admission:  2/22/2018  Date of Service (when I saw the patient): 02/22/18    Assessment & Plan   Barbara Smiley is a 52 year old female with a PMH significant for HLD, hypothyroidism secondary to Hashimoto thyroiditis, who presents for evaluation of headache. Workup in the ED reveals VSS. BMP and CBC w/ diff unremarkable. CRP 6.3. CT head w/o contrast and CT angiogram of head and neck are negative for acute pathology. Patient received 1 L IV NS, 2.5 mg IV dilaudid, 4 mg IV zofran, 5 mg IV reglan, 0.5 mg IV ativan, 25 mg IV benadryl, 125 mg IV solumedrol in the ED.    1. Intractable headache: At this point, suspect tension headache vs atypical migraine. New onset of headaches last week with no prior headache history per patient, unclear why migraines are listed on her [facility] medical problem list in [facility]. CT of the head and CTA head and neck negative for acute pathology. Of note, MRI 5/2008 negative for acute pathology but did note mild nonspecific subcortical white matter disease likely related to minimal small vessel ischemic changes, but slightly more than usual for patient of her age at that time. No focal neurologic deficits. Reports generalized frontal headache with some occipital headache as well today. Had initial improvement in headaches with sumatriptan, but has felt less and less effective lately. Has associated intermittent blurred vision and phonophobia.   -Will give 1-time dose sumatriptan 6 mg subcutaneously (had po sumatriptan around 2 PM today)  -Schedule tylenol Q8H and alternate with scheduled ibuprofen Q8H. Prn oxycodone and hydroxyzine.   -Consider neurology consult vs outpatient referral +/- MRI if persisting symptoms or new exam findings    2. HLD: Continue PTA atorvastatin 10 mg daily.    3.  Hypothyroidism secondary to Hashimoto thyroiditis: Diagnosed in 2007. Follows with endocrinologist Dr. Jose. Started on levothyroxine and FNA biopsy was reportedly benign. Currently treated with levothyroxine 88 mcg daily.     DVT Prophylaxis: Low Risk/Ambulatory with no VTE prophylaxis indicated  Code Status: Full Code  Disposition: Expected discharge in < 2 evenings.    Primary Care Physician   Ijeoma Finn    Chief Complaint   Headache    History is obtained from the patient.    History of Present Illness   Barbara Smiley is a 52 year old female with a PMH significant for HLD, hypothyroidism secondary to Hashimoto thyroiditis, who presents for evaluation of headache.     The patient reports that 10 days ago she had acute onset of an intense, throbbing frontal headache that lasted for 4 minutes and then resolved without any intervention. This occurred while she was sitting in her Bible study discussion group. She endorsed associated nausea but no vomiting at that time. The same type of headache returned 2 days later but persisted for at least 2 hours, so she went to urgent care. Her headache improved after two doses of subcutaneous imitrex. She got a script of sumatriptan and was instructed to follow up with her primary care clinician. Her headache recurred the following day and improved with sumatriptan. Then it returned 2 days after that and improved a little with sumatriptan, but not as much as it had before. Since then she has had a dull frontal headache that has been managed enough with Excedrin and tylenol so that she could continue her daily activities for the most part. She was supposed to see her primary care clinician about these headaches today. However, today she had recurrence of her frontal headache along with occipital headache that was described as intensely severe and throbbing, associated with nausea and vomiting, so she came to the ER.    She denies prior history of migraine  headaches or similar headaches. She denied recent fever, weight loss, change in mental status, head trauma, illicit drug use, or worsening with vagal maneuvers. She reports that she has been sleeping well, consistently getting 6-10 hours of sleep a night. She denies nasal congestion, rhinorrhea, lacrimation, or vision loss. She denies difficulty with speech, focal weakness, or sensation deficits. She denies photophobia but thinks that she does have associated phonophobia.    Workup in the ED reveals VSS. BMP and CBC w/ diff unremarkable. CRP 6.3. CT head w/o contrast and CT angiogram of head and neck are negative for acute pathology. Patient received 1 L IV NS, 2.5 mg IV dilaudid, 4 mg IV zofran, 5 mg IV reglan, 0.5 mg IV ativan, 25 mg IV benadryl, 125 mg IV solumedrol in the ED. Dr. Avina requested registration to observation for further management. She spoke to Dr. Gmabino regarding patient who agreed that subarachnoid hemorrhage was very unlikely given her negative CT and CTA of head and neck.    Past Medical History    Past Medical History:   Diagnosis Date     Chronic lymphocytic thyroiditis     Hashimoto thyroiditis     Esophageal reflux      Urinary tract infection, site not specified     in the past     Past Surgical History   Past Surgical History:   Procedure Laterality Date     ENT SURGERY  toddler    Tosillectomy     GYN SURGERY  2007    novasure     HYSTEROSCOPY  2008    removal of ovarian cyst and endometriosis        Prior to Admission Medications   Prior to Admission Medications   Prescriptions Last Dose Informant Patient Reported? Taking?   MULTI-VITAMIN OR TABS   No No   VIACTIV 500-100-40 MG-IU-MCG OR CHEW   No No   atorvastatin (LIPITOR) 10 MG tablet   No No   Sig: Take 1 tablet (10 mg) by mouth daily   levothyroxine (SYNTHROID/LEVOTHROID) 88 MCG tablet   No No   Si tab six days/week, 0.5 tab one day/week.  Total weekly dose 6.5 tabs/week.   metFORMIN (GLUCOPHAGE) 500 MG tablet   No No    Sig: Take 1 tablet (500 mg) by mouth 2 times daily (with meals)      Facility-Administered Medications: None     Allergies   Allergies   Allergen Reactions     Sulfa Drugs        Social History   Social History   Substance Use Topics     Smoking status: Never Smoker     Smokeless tobacco: Never Used     Alcohol use Yes      Comment: a couple drinks per year     Social History     Social History Narrative       Family History   Family history reviewed with patient and is noncontributory.    Review of Systems   A Comprehensive greater than 10 system review of systems was carried out.  Pertinent positives and negatives are noted above.  Otherwise negative for contributory information.    Physical Exam   Temp: 98.7  F (37.1  C) Temp src: Temporal BP: 141/70 Pulse: 88   Resp: 20 SpO2: 94 % O2 Device: None (Room air)    Vital Signs with Ranges  Temp:  [98.7  F (37.1  C)] 98.7  F (37.1  C)  Pulse:  [88] 88  Resp:  [20] 20  BP: (123-185)/() 141/70  SpO2:  [93 %-100 %] 94 %  0 lbs 0 oz    GEN:  Alert, oriented x 3, appears quite sleepy after ED interventions, no overt distress  HEENT:  Normocephalic/atraumatic, no scleral icterus, no nasal discharge, mouth mildly dry.  CV:  Regular rate and rhythm, no murmur or JVD.  S1 + S2 noted, no S3 or S4.  LUNGS:  Clear to auscultation bilaterally without rales/rhonchi/wheezing/retractions.  Symmetric chest rise on inhalation noted.  ABD:  Active bowel sounds, soft, non-tender/non-distended.  No rebound/guarding/rigidity.  EXT:  No edema.  No cyanosis.  No acute joint synovitis noted.  SKIN:  Dry to touch, no exanthems noted in the visualized areas.  NEURO:  Symmetric muscle strength, sensation to touch grossly intact.  Coordination symmetric on general exam.  No new focal deficits appreciated.    Data   Data reviewed today:  I personally reviewed CT head w/o contrast and CT angiogram of head and neck are negative for acute pathology.     Results for orders placed or performed  during the hospital encounter of 02/22/18 (from the past 48 hour(s))   CBC with platelets differential   Result Value Ref Range    WBC 11.0 4.0 - 11.0 10e9/L    RBC Count 4.72 3.8 - 5.2 10e12/L    Hemoglobin 13.7 11.7 - 15.7 g/dL    Hematocrit 40.5 35.0 - 47.0 %    MCV 86 78 - 100 fl    MCH 29.0 26.5 - 33.0 pg    MCHC 33.8 31.5 - 36.5 g/dL    RDW 12.6 10.0 - 15.0 %    Platelet Count 337 150 - 450 10e9/L    Diff Method Automated Method     % Neutrophils 61.1 %    % Lymphocytes 30.9 %    % Monocytes 6.3 %    % Eosinophils 1.1 %    % Basophils 0.4 %    % Immature Granulocytes 0.2 %    Nucleated RBCs 0 0 /100    Absolute Neutrophil 6.7 1.6 - 8.3 10e9/L    Absolute Lymphocytes 3.4 0.8 - 5.3 10e9/L    Absolute Monocytes 0.7 0.0 - 1.3 10e9/L    Absolute Eosinophils 0.1 0.0 - 0.7 10e9/L    Absolute Basophils 0.0 0.0 - 0.2 10e9/L    Abs Immature Granulocytes 0.0 0 - 0.4 10e9/L    Absolute Nucleated RBC 0.0    Basic metabolic panel   Result Value Ref Range    Sodium 137 133 - 144 mmol/L    Potassium 3.4 3.4 - 5.3 mmol/L    Chloride 105 94 - 109 mmol/L    Carbon Dioxide 23 20 - 32 mmol/L    Anion Gap 9 3 - 14 mmol/L    Glucose 114 (H) 70 - 99 mg/dL    Urea Nitrogen 19 7 - 30 mg/dL    Creatinine 0.74 0.52 - 1.04 mg/dL    GFR Estimate 82 >60 mL/min/1.7m2    GFR Estimate If Black >90 >60 mL/min/1.7m2    Calcium 9.3 8.5 - 10.1 mg/dL   Erythrocyte sedimentation rate auto   Result Value Ref Range    Sed Rate 8 0 - 30 mm/h   CRP inflammation   Result Value Ref Range    CRP Inflammation 6.3 0.0 - 8.0 mg/L   Creatinine POCT   Result Value Ref Range    Creatinine 0.8 0.52 - 1.04 mg/dL    GFR Estimate 76 >60 mL/min/1.7m2    GFR Estimate If Black >90 >60 mL/min/1.7m2   CT Head w/o Contrast    Narrative    CT HEAD WITHOUT CONTRAST  2/22/2018 4:10 PM    HISTORY: Headache.     TECHNIQUE: Scans were obtained through the head without IV contrast.   Radiation dose for this scan was reduced using automated exposure  control, adjustment of the mA  and/or kV according to patient size, or  iterative reconstruction technique.    COMPARISON: MR 6/2/2008.    FINDINGS: The ventricles are normal in size and position. There are  several extra-axial small calcifications along the tentorial surface  without mass effect. They are likely not of any acute clinical  significance.  No hemorrhage, mass lesion, or focal area of acute  infarction identified. Paranasal sinuses are normal. No bony  abnormality.       Impression    IMPRESSION: No acute intracranial abnormality. CT angiogram to follow.     CT Head Neck Angio w/o & w Contrast    Narrative    CT ANGIOGRAM OF THE HEAD AND NECK WITHOUT AND WITH CONTRAST  2/22/2018  4:11 PM     HISTORY: Intense headache, nausea and intermittent difficulty in  seeing today. Balance difficulty.    TECHNIQUE: Precontrast localizing scans were followed by CT  angiography with an injection of 70mL Isovue-370 IV with scans through  the head and neck. Images were transferred to a separate 3-D  workstation where multiplanar reformations and 3-D images were  created. Estimates of carotid stenoses are made relative to the distal  internal carotid artery diameters except as noted. Radiation dose for  this scan was reduced using automated exposure control, adjustment of  the mA and/or kV according to patient size, or iterative  reconstruction technique.    COMPARISON: MRI brain 6/2/2008    CT HEAD FINDINGS: No contrast enhancing lesions. Cerebral blood flow  is grossly normal.    CT ANGIOGRAM HEAD FINDINGS: Arteries are widely patent with no  aneurysm, significant stenosis, occlusion or intraarterial thrombus.  Venous circulation is unremarkable.     CT ANGIOGRAM NECK FINDINGS:   Right carotid artery: No significant stenosis.      Left carotid artery: No significant stenosis.      Vertebral arteries: No significant stenosis.      Other findings: Cervical spine degenerative changes.      Impression    IMPRESSION: Normal CT angiogram of the head and  neck.    I agree with the preliminary report that was communicated to the  referring physician.    MD Della CLIFTON PA-C

## 2018-02-23 NOTE — ED NOTES
[facility]  ED Nurse Handoff Report    Barbara Smiley is a 52 year old female   ED Chief complaint: Headache  . ED Diagnosis:   Final diagnoses:   Acute intractable headache, unspecified headache type     Allergies:   Allergies   Allergen Reactions     Sulfa Drugs        Code Status: Full Code  Activity level - Baseline/Home:  Independent. Activity Level - Current:   Stand with Assist. Lift room needed: No. Bariatric: No   Needed: No   Isolation: No. Infection: Not Applicable.     Vital Signs:   Vitals:    02/22/18 1830 02/22/18 1845 02/22/18 1930 02/22/18 2030   BP: 137/78 148/78 144/75 141/70   Pulse:       Resp:       Temp:       TempSrc:       SpO2: 96% 97% 99% 94%       Cardiac Rhythm:  ,      Pain level: 0-10 Pain Scale: 4  Patient confused: No. Patient Falls Risk: Yes.   Elimination Status: Has voided   Patient Report - Initial Complaint: Headache. Focused Assessment: Pt A&Ox 4 ABC's intact. Pt in with headache which has improved at this time when pt is resting in stretcher. Pt up to ambulate and reports increased nausea, reports feeling ok at rest.   Tests Performed: Labs, CT. Abnormal Results:   Labs Ordered and Resulted from Time of ED Arrival Up to the Time of Departure from the ED   BASIC METABOLIC PANEL - Abnormal; Notable for the following:        Result Value    Glucose 114 (*)     All other components within normal limits   CBC WITH PLATELETS DIFFERENTIAL   ERYTHROCYTE SEDIMENTATION RATE AUTO   CRP INFLAMMATION   CREATININE POCT   ISTAT CREATININE NURSING POCT     Treatments provided:benadryl, Reglan, dilaudid  Family Comments:  at bedside   OBS brochure/video discussed/provided to patient:  Yes  ED Medications:   Medications   ondansetron (ZOFRAN) injection 4 mg (4 mg Intravenous Given 2/22/18 1802)   HYDROmorphone (DILAUDID) injection 1 mg (1 mg Intravenous Given 2/22/18 1523)   HYDROmorphone (PF) (DILAUDID) injection 0.5 mg (0.5 mg Intravenous Given 2/22/18  1802)   0.9% sodium chloride BOLUS (0 mLs Intravenous Stopped 2/22/18 1609)   iopamidol (ISOVUE-370) solution 500 mL (70 mLs Intravenous Given 2/22/18 1602)   LORazepam (ATIVAN) injection 0.5 mg (0.5 mg Intravenous Given 2/22/18 1634)   methylPREDNISolone sodium succinate (solu-MEDROL) injection 125 mg (125 mg Intravenous Given 2/22/18 1706)   metoclopramide (REGLAN) injection 5 mg (5 mg Intravenous Given 2/22/18 1850)   diphenhydrAMINE (BENADRYL) injection 25 mg (25 mg Intravenous Given 2/22/18 1848)     Drips infusing:  No  For the majority of the shift, the patient's behavior Green. Interventions performed were .     Severe Sepsis OR Septic Shock Diagnosis Present: No      ED Nurse Name/Phone Number: Gerald Rabago,   9:03 PM    RECEIVING UNIT ED HANDOFF REVIEW    Above ED Nurse Handoff Report was reviewed: yes  Reviewed by: Stephania Reinoso on February 22, 2018 at 9:12 PM

## 2018-02-23 NOTE — PLAN OF CARE
Problem: Patient Care Overview  Goal: Plan of Care/Patient Progress Review  ROOM # 227    Living Situation (if not independent, order SW consult): Ind with   Facility name:  : -[facility]- [phone]    Activity level at baseline: Ind  Activity level on admit: SBA      Patient registered to observation; given Patient Bill of Rights; given the opportunity to ask questions about observation status and their plan of care.  Patient has been oriented to the observation room, bathroom and call light is in place.    Discussed discharge goals and expectations with patient/family.

## 2018-02-23 NOTE — PROGRESS NOTES
Pt was here today for a Lumbar Puncture under fluoroscopy guidance. Procedure performed by Dr Gambino. Procedure went well and was tolerated well and  12 Cc's of  Clear colorless CSF was removed and sent to lab for testing. Pt was given verbal and written instructions post procedure. Pt was discharged in satisfactory condition and was taken back to her room by Radiology staff.

## 2018-02-24 LAB
EV RNA SPEC QL NAA+PROBE: NEGATIVE
HSV1 DNA CSF QL NAA+PROBE: NOT DETECTED
HSV2 DNA CSF QL NAA+PROBE: NOT DETECTED
MICROBIOLOGIST REVIEW: NORMAL
SPECIMEN SOURCE: NORMAL

## 2018-02-24 NOTE — PLAN OF CARE
Problem: Patient Care Overview  Goal: Discharge Needs Assessment  Outcome: Adequate for Discharge Date Met: 02/23/18  Patient's After Visit Summary was reviewed with patient and/or spouse.   Patient verbalized understanding of After Visit Summary, recommended follow up and was given an opportunity to ask questions.   Discharge medications sent home with patient/family: YES, gabapentin   Discharged with spouse    OBSERVATION patient END time: 6520

## 2018-02-24 NOTE — PLAN OF CARE
Problem: Patient Care Overview  Goal: Discharge Needs Assessment  Outcome: Adequate for Discharge Date Met: 02/23/18  Problem: Patient Care Overview  Goal: Plan of Care/Patient Progress Review  Outcome: Improving  PRIMARY DIAGNOSIS: ACUTE PAIN - headache  OUTPATIENT/OBSERVATION GOALS TO BE MET BEFORE DISCHARGE:  1. Pain Status: Improved-controlled with oral pain medications.      2. Return to near baseline physical activity: Yes      3. Cleared for discharge by consultants (if involved): Yes      Discharge Planner Nurse   Safe discharge environment identified: Yes  Barriers to discharge: Yes      A&Ox4. Vitals stable. Denies chest pain or SOB. Has 4/10 headache, but denies intervention. Patient denies nausea. Will be able to d/c this evening. Tolerating regular diet well. Head MRI negative for intracranial hemorrhage or focal mass lesions. Neurology saw and recommended PT for neck tension/tightness. LP was done and results pending, but VIV Alcocer will call patient with results at home. Will await discharge orders.

## 2018-02-25 DIAGNOSIS — E06.3 CHRONIC LYMPHOCYTIC THYROIDITIS: ICD-10-CM

## 2018-02-26 ENCOUNTER — APPOINTMENT (OUTPATIENT)
Dept: MRI IMAGING | Facility: CLINIC | Age: 53
End: 2018-02-26
Attending: PHYSICIAN ASSISTANT
Payer: COMMERCIAL

## 2018-02-26 ENCOUNTER — HOSPITAL ENCOUNTER (OUTPATIENT)
Facility: CLINIC | Age: 53
Setting detail: OBSERVATION
Discharge: HOME | End: 2018-02-27
Attending: EMERGENCY MEDICINE | Admitting: HOSPITALIST
Payer: COMMERCIAL

## 2018-02-26 DIAGNOSIS — G44.209 TENSION HEADACHE: Primary | ICD-10-CM

## 2018-02-26 LAB
ANION GAP SERPL CALCULATED.3IONS-SCNC: 8 MMOL/L (ref 3–14)
BASOPHILS # BLD AUTO: 0 10E9/L (ref 0–0.2)
BASOPHILS NFR BLD AUTO: 0.4 %
BUN SERPL-MCNC: 19 MG/DL (ref 7–30)
CALCIUM SERPL-MCNC: 9.2 MG/DL (ref 8.5–10.1)
CHLORIDE SERPL-SCNC: 108 MMOL/L (ref 94–109)
CO2 SERPL-SCNC: 24 MMOL/L (ref 20–32)
CREAT SERPL-MCNC: 0.79 MG/DL (ref 0.52–1.04)
DIFFERENTIAL METHOD BLD: NORMAL
DIFFERENTIAL METHOD BLD: NORMAL
EOSINOPHIL # BLD AUTO: 0.2 10E9/L (ref 0–0.7)
EOSINOPHIL NFR BLD AUTO: 1.7 %
ERYTHROCYTE [DISTWIDTH] IN BLOOD BY AUTOMATED COUNT: 12.8 % (ref 10–15)
ERYTHROCYTE [DISTWIDTH] IN BLOOD BY AUTOMATED COUNT: NORMAL % (ref 10–15)
ERYTHROCYTE [SEDIMENTATION RATE] IN BLOOD BY WESTERGREN METHOD: 7 MM/H (ref 0–30)
GFR SERPL CREATININE-BSD FRML MDRD: 76 ML/MIN/1.7M2
GLUCOSE SERPL-MCNC: 121 MG/DL (ref 70–99)
HCT VFR BLD AUTO: 41.4 % (ref 35–47)
HCT VFR BLD AUTO: NORMAL % (ref 35–47)
HGB BLD-MCNC: 14.4 G/DL (ref 11.7–15.7)
HGB BLD-MCNC: NORMAL G/DL (ref 11.7–15.7)
IMM GRANULOCYTES # BLD: 0.1 10E9/L (ref 0–0.4)
IMM GRANULOCYTES NFR BLD: 0.9 %
LYMPHOCYTES # BLD AUTO: 2.9 10E9/L (ref 0.8–5.3)
LYMPHOCYTES NFR BLD AUTO: 27.4 %
MCH RBC QN AUTO: 29.2 PG (ref 26.5–33)
MCH RBC QN AUTO: NORMAL PG (ref 26.5–33)
MCHC RBC AUTO-ENTMCNC: 34.8 G/DL (ref 31.5–36.5)
MCHC RBC AUTO-ENTMCNC: NORMAL G/DL (ref 31.5–36.5)
MCV RBC AUTO: 84 FL (ref 78–100)
MCV RBC AUTO: NORMAL FL (ref 78–100)
MONOCYTES # BLD AUTO: 0.7 10E9/L (ref 0–1.3)
MONOCYTES NFR BLD AUTO: 6.6 %
NEUTROPHILS # BLD AUTO: 6.6 10E9/L (ref 1.6–8.3)
NEUTROPHILS NFR BLD AUTO: 63 %
NRBC # BLD AUTO: 0 10*3/UL
NRBC BLD AUTO-RTO: 0 /100
PLATELET # BLD AUTO: 316 10E9/L (ref 150–450)
PLATELET # BLD AUTO: NORMAL 10E9/L (ref 150–450)
POTASSIUM SERPL-SCNC: 3.8 MMOL/L (ref 3.4–5.3)
RBC # BLD AUTO: 4.93 10E12/L (ref 3.8–5.2)
RBC # BLD AUTO: NORMAL 10E12/L (ref 3.8–5.2)
SODIUM SERPL-SCNC: 140 MMOL/L (ref 133–144)
T4 FREE SERPL-MCNC: 0.84 NG/DL (ref 0.76–1.46)
TSH SERPL DL<=0.005 MIU/L-ACNC: 5.36 MU/L (ref 0.4–4)
WBC # BLD AUTO: 10.4 10E9/L (ref 4–11)
WBC # BLD AUTO: NORMAL 10E9/L (ref 4–11)

## 2018-02-26 PROCEDURE — 84443 ASSAY THYROID STIM HORMONE: CPT | Performed by: PHYSICIAN ASSISTANT

## 2018-02-26 PROCEDURE — 25000128 H RX IP 250 OP 636: Performed by: RADIOLOGY

## 2018-02-26 PROCEDURE — 96361 HYDRATE IV INFUSION ADD-ON: CPT

## 2018-02-26 PROCEDURE — 84439 ASSAY OF FREE THYROXINE: CPT | Performed by: PHYSICIAN ASSISTANT

## 2018-02-26 PROCEDURE — 99220 ZZC INITIAL OBSERVATION CARE,LEVL III: CPT | Performed by: PHYSICIAN ASSISTANT

## 2018-02-26 PROCEDURE — 25000132 ZZH RX MED GY IP 250 OP 250 PS 637: Performed by: PHYSICIAN ASSISTANT

## 2018-02-26 PROCEDURE — 25000128 H RX IP 250 OP 636: Performed by: PHYSICIAN ASSISTANT

## 2018-02-26 PROCEDURE — 85025 COMPLETE CBC W/AUTO DIFF WBC: CPT | Performed by: EMERGENCY MEDICINE

## 2018-02-26 PROCEDURE — 25000125 ZZHC RX 250: Performed by: EMERGENCY MEDICINE

## 2018-02-26 PROCEDURE — 96375 TX/PRO/DX INJ NEW DRUG ADDON: CPT

## 2018-02-26 PROCEDURE — G0378 HOSPITAL OBSERVATION PER HR: HCPCS

## 2018-02-26 PROCEDURE — 80048 BASIC METABOLIC PNL TOTAL CA: CPT | Performed by: EMERGENCY MEDICINE

## 2018-02-26 PROCEDURE — 36415 COLL VENOUS BLD VENIPUNCTURE: CPT | Performed by: EMERGENCY MEDICINE

## 2018-02-26 PROCEDURE — 96374 THER/PROPH/DIAG INJ IV PUSH: CPT

## 2018-02-26 PROCEDURE — 85652 RBC SED RATE AUTOMATED: CPT | Performed by: PHYSICIAN ASSISTANT

## 2018-02-26 PROCEDURE — 96376 TX/PRO/DX INJ SAME DRUG ADON: CPT

## 2018-02-26 PROCEDURE — 99285 EMERGENCY DEPT VISIT HI MDM: CPT | Mod: 25

## 2018-02-26 PROCEDURE — 70546 MR ANGIOGRAPH HEAD W/O&W/DYE: CPT

## 2018-02-26 PROCEDURE — 36415 COLL VENOUS BLD VENIPUNCTURE: CPT | Performed by: PHYSICIAN ASSISTANT

## 2018-02-26 PROCEDURE — 25000128 H RX IP 250 OP 636: Performed by: EMERGENCY MEDICINE

## 2018-02-26 PROCEDURE — A9585 GADOBUTROL INJECTION: HCPCS | Performed by: RADIOLOGY

## 2018-02-26 PROCEDURE — 96372 THER/PROPH/DIAG INJ SC/IM: CPT

## 2018-02-26 RX ORDER — HYDROMORPHONE HYDROCHLORIDE 1 MG/ML
0.5 INJECTION, SOLUTION INTRAMUSCULAR; INTRAVENOUS; SUBCUTANEOUS
Status: DISCONTINUED | OUTPATIENT
Start: 2018-02-26 | End: 2018-02-27 | Stop reason: HOSPADM

## 2018-02-26 RX ORDER — LIDOCAINE 40 MG/G
CREAM TOPICAL
Status: DISCONTINUED | OUTPATIENT
Start: 2018-02-26 | End: 2018-02-27 | Stop reason: HOSPADM

## 2018-02-26 RX ORDER — ONDANSETRON 2 MG/ML
4 INJECTION INTRAMUSCULAR; INTRAVENOUS EVERY 6 HOURS PRN
Status: DISCONTINUED | OUTPATIENT
Start: 2018-02-26 | End: 2018-02-27 | Stop reason: HOSPADM

## 2018-02-26 RX ORDER — KETOROLAC TROMETHAMINE 30 MG/ML
30 INJECTION, SOLUTION INTRAMUSCULAR; INTRAVENOUS EVERY 6 HOURS PRN
Status: DISCONTINUED | OUTPATIENT
Start: 2018-02-26 | End: 2018-02-26

## 2018-02-26 RX ORDER — HYDROXYZINE HYDROCHLORIDE 50 MG/1
50 TABLET, FILM COATED ORAL EVERY 6 HOURS PRN
Status: DISCONTINUED | OUTPATIENT
Start: 2018-02-26 | End: 2018-02-26

## 2018-02-26 RX ORDER — RIZATRIPTAN BENZOATE 5 MG/1
5 TABLET, ORALLY DISINTEGRATING ORAL
Status: COMPLETED | OUTPATIENT
Start: 2018-02-26 | End: 2018-02-26

## 2018-02-26 RX ORDER — IBUPROFEN 600 MG/1
600 TABLET, FILM COATED ORAL EVERY 6 HOURS PRN
Status: DISCONTINUED | OUTPATIENT
Start: 2018-02-26 | End: 2018-02-26

## 2018-02-26 RX ORDER — TIZANIDINE 2 MG/1
2 TABLET ORAL EVERY 6 HOURS PRN
Status: DISCONTINUED | OUTPATIENT
Start: 2018-02-26 | End: 2018-02-27 | Stop reason: HOSPADM

## 2018-02-26 RX ORDER — PROCHLORPERAZINE MALEATE 5 MG
10 TABLET ORAL EVERY 6 HOURS PRN
Status: DISCONTINUED | OUTPATIENT
Start: 2018-02-26 | End: 2018-02-27 | Stop reason: HOSPADM

## 2018-02-26 RX ORDER — NALOXONE HYDROCHLORIDE 0.4 MG/ML
.1-.4 INJECTION, SOLUTION INTRAMUSCULAR; INTRAVENOUS; SUBCUTANEOUS
Status: DISCONTINUED | OUTPATIENT
Start: 2018-02-26 | End: 2018-02-27 | Stop reason: HOSPADM

## 2018-02-26 RX ORDER — ACETAMINOPHEN 325 MG/1
650 TABLET ORAL EVERY 6 HOURS
Status: DISCONTINUED | OUTPATIENT
Start: 2018-02-26 | End: 2018-02-27 | Stop reason: HOSPADM

## 2018-02-26 RX ORDER — SUMATRIPTAN 50 MG/1
50 TABLET, FILM COATED ORAL EVERY 8 HOURS PRN
Status: DISCONTINUED | OUTPATIENT
Start: 2018-02-26 | End: 2018-02-26

## 2018-02-26 RX ORDER — OLANZAPINE 10 MG/2ML
2.5 INJECTION, POWDER, FOR SOLUTION INTRAMUSCULAR DAILY PRN
Status: DISCONTINUED | OUTPATIENT
Start: 2018-02-26 | End: 2018-02-26

## 2018-02-26 RX ORDER — LEVOTHYROXINE SODIUM 88 UG/1
88 TABLET ORAL DAILY
Status: DISCONTINUED | OUTPATIENT
Start: 2018-02-26 | End: 2018-02-27 | Stop reason: HOSPADM

## 2018-02-26 RX ORDER — ONDANSETRON 4 MG/1
4 TABLET, ORALLY DISINTEGRATING ORAL EVERY 6 HOURS PRN
Status: DISCONTINUED | OUTPATIENT
Start: 2018-02-26 | End: 2018-02-27 | Stop reason: HOSPADM

## 2018-02-26 RX ORDER — POLYETHYLENE GLYCOL 3350 17 G/17G
17 POWDER, FOR SOLUTION ORAL DAILY PRN
Status: DISCONTINUED | OUTPATIENT
Start: 2018-02-26 | End: 2018-02-27 | Stop reason: HOSPADM

## 2018-02-26 RX ORDER — HYDROXYZINE HYDROCHLORIDE 25 MG/1
25 TABLET, FILM COATED ORAL EVERY 6 HOURS PRN
Status: DISCONTINUED | OUTPATIENT
Start: 2018-02-26 | End: 2018-02-26

## 2018-02-26 RX ORDER — AMOXICILLIN 250 MG
1 CAPSULE ORAL 2 TIMES DAILY PRN
Status: DISCONTINUED | OUTPATIENT
Start: 2018-02-26 | End: 2018-02-27 | Stop reason: HOSPADM

## 2018-02-26 RX ORDER — KETOROLAC TROMETHAMINE 30 MG/ML
30 INJECTION, SOLUTION INTRAMUSCULAR; INTRAVENOUS EVERY 6 HOURS
Status: DISCONTINUED | OUTPATIENT
Start: 2018-02-26 | End: 2018-02-27 | Stop reason: HOSPADM

## 2018-02-26 RX ORDER — GADOBUTROL 604.72 MG/ML
10 INJECTION INTRAVENOUS ONCE
Status: COMPLETED | OUTPATIENT
Start: 2018-02-26 | End: 2018-02-26

## 2018-02-26 RX ORDER — DIPHENHYDRAMINE HYDROCHLORIDE 50 MG/ML
25 INJECTION INTRAMUSCULAR; INTRAVENOUS ONCE
Status: COMPLETED | OUTPATIENT
Start: 2018-02-26 | End: 2018-02-26

## 2018-02-26 RX ORDER — CYCLOBENZAPRINE HCL 10 MG
10 TABLET ORAL 3 TIMES DAILY
Status: DISCONTINUED | OUTPATIENT
Start: 2018-02-26 | End: 2018-02-26

## 2018-02-26 RX ORDER — GABAPENTIN 300 MG/1
300 CAPSULE ORAL 2 TIMES DAILY
Status: DISCONTINUED | OUTPATIENT
Start: 2018-02-26 | End: 2018-02-26

## 2018-02-26 RX ORDER — METOCLOPRAMIDE HYDROCHLORIDE 5 MG/ML
10 INJECTION INTRAMUSCULAR; INTRAVENOUS ONCE
Status: COMPLETED | OUTPATIENT
Start: 2018-02-26 | End: 2018-02-26

## 2018-02-26 RX ORDER — AMOXICILLIN 250 MG
2 CAPSULE ORAL 2 TIMES DAILY PRN
Status: DISCONTINUED | OUTPATIENT
Start: 2018-02-26 | End: 2018-02-27 | Stop reason: HOSPADM

## 2018-02-26 RX ORDER — PROCHLORPERAZINE 25 MG
25 SUPPOSITORY, RECTAL RECTAL EVERY 12 HOURS PRN
Status: DISCONTINUED | OUTPATIENT
Start: 2018-02-26 | End: 2018-02-27 | Stop reason: HOSPADM

## 2018-02-26 RX ORDER — OXYCODONE HYDROCHLORIDE 5 MG/1
5-10 TABLET ORAL
Status: DISCONTINUED | OUTPATIENT
Start: 2018-02-26 | End: 2018-02-27 | Stop reason: HOSPADM

## 2018-02-26 RX ADMIN — PROCHLORPERAZINE EDISYLATE 10 MG: 5 INJECTION INTRAMUSCULAR; INTRAVENOUS at 09:08

## 2018-02-26 RX ADMIN — OLANZAPINE 2.5 MG: 10 INJECTION, POWDER, LYOPHILIZED, FOR SOLUTION INTRAMUSCULAR at 11:24

## 2018-02-26 RX ADMIN — AMITRIPTYLINE HYDROCHLORIDE 25 MG: 25 TABLET, FILM COATED ORAL at 21:29

## 2018-02-26 RX ADMIN — GADOBUTROL 10 ML: 604.72 INJECTION INTRAVENOUS at 17:18

## 2018-02-26 RX ADMIN — ACETAMINOPHEN 650 MG: 325 TABLET, FILM COATED ORAL at 15:47

## 2018-02-26 RX ADMIN — RIZATRIPTAN BENZOATE 5 MG: 5 TABLET, ORALLY DISINTEGRATING ORAL at 17:08

## 2018-02-26 RX ADMIN — SODIUM CHLORIDE 1000 ML: 9 INJECTION, SOLUTION INTRAVENOUS at 11:24

## 2018-02-26 RX ADMIN — KETOROLAC TROMETHAMINE 30 MG: 30 INJECTION, SOLUTION INTRAMUSCULAR at 23:43

## 2018-02-26 RX ADMIN — ACETAMINOPHEN 650 MG: 325 TABLET, FILM COATED ORAL at 21:29

## 2018-02-26 RX ADMIN — CYCLOBENZAPRINE HYDROCHLORIDE 10 MG: 10 TABLET, FILM COATED ORAL at 15:47

## 2018-02-26 RX ADMIN — KETOROLAC TROMETHAMINE 30 MG: 30 INJECTION, SOLUTION INTRAMUSCULAR at 16:51

## 2018-02-26 RX ADMIN — Medication 0.5 MG: at 15:48

## 2018-02-26 RX ADMIN — SODIUM CHLORIDE 1000 ML: 9 INJECTION, SOLUTION INTRAVENOUS at 09:06

## 2018-02-26 RX ADMIN — METOCLOPRAMIDE 10 MG: 5 INJECTION, SOLUTION INTRAMUSCULAR; INTRAVENOUS at 09:48

## 2018-02-26 RX ADMIN — DIPHENHYDRAMINE HYDROCHLORIDE 25 MG: 50 INJECTION INTRAMUSCULAR; INTRAVENOUS at 09:07

## 2018-02-26 RX ADMIN — LEVOTHYROXINE SODIUM 88 MCG: 88 TABLET ORAL at 16:50

## 2018-02-26 ASSESSMENT — ENCOUNTER SYMPTOMS
COUGH: 0
FEVER: 0
PHOTOPHOBIA: 1
HEADACHES: 1
RHINORRHEA: 0
NAUSEA: 0
VOMITING: 0

## 2018-02-26 NOTE — ED PROVIDER NOTES
"  History     Chief Complaint:  Migraine    HPI   Barbara Smiley is a 52 year old female who presents with a migraine headache that was \"exactly the same\" from the one which required admission to the hospital for 4 days ago. Workup showed a normal lumbar puncture as well as imaging below. The patient was discharged the following day. She states that her headaches returned both 2 days ago and yesterday, each lasting approximately 6 hours and relieved with Sumatriptan and Excedrin Migraine. These headaches are localized to the front right. The patient has not had headaches prior to 2 weeks ago. The patient denies recent fevers, cough, nausea, vomiting and rhinorrhea.    CT Head/Neck Angiogram - 2/22/18  Normal CT angiogram of the head and neck.    MRI -2/22/18  1. Few tiny nonspecific white matter lesions.  2. No evidence for intracranial hemorrhage or any acute process.    Allergies:  Sulfa Drugs    Medications:    SUMAtriptan Succinate (IMITREX PO)  IBUPROFEN PO  aspirin-acetaminophen-caffeine (EXCEDRIN MIGRAINE) 250-250-65 MG per tablet  gabapentin (NEURONTIN) 300 MG capsule  calcium-vitamin D-vitamin K (VIACTIV) 500-500-40 MG-UNT-MCG CHEW  Multiple Vitamins-Minerals (MULTIVITAMIN ADULT) CHEW  Omega-3 Fatty Acids (FISH OIL ADULT GUMMIES PO)  TURMERIC PO  levothyroxine (SYNTHROID/LEVOTHROID) 88 MCG tablet  atorvastatin (LIPITOR) 10 MG tablet    Past Medical History:    Chronic lymphocytic thyroiditis  Esophageal reflux  UTIs  Conductive hearing loss  Hypothyroidism  Adjustment disorder  Obesity  Hyperlipidemia  Impaired fasting glucose    Past Surgical History:    Tonsillectomy  Novasure  Removal of ovarian cyst and endometriosis    Family History:    Mother: CAD, diabetes, atrial fibrillation, thyroid disease, esophageal cancer  Father: CAD, diabetes    Social History:  Smoking Status: Never  Smokeless Tobacco: Never used  Alcohol Use: Rare  Marital Status:        Review of Systems   Constitutional: " Negative for fever.   HENT: Negative for rhinorrhea.    Eyes: Positive for photophobia.   Respiratory: Negative for cough.    Gastrointestinal: Negative for nausea and vomiting.   Neurological: Positive for headaches.   All other systems reviewed and are negative.        Physical Exam     Patient Vitals for the past 24 hrs:   BP Temp Temp src Pulse Heart Rate Resp SpO2   02/26/18 1645 140/62 99.1  F (37.3  C) Axillary - 64 16 98 %   02/26/18 1445 121/48 98  F (36.7  C) - 60 - 20 97 %   02/26/18 1315 146/70 - - 56 - 18 97 %   02/26/18 1300 - - - - - - 97 %   02/26/18 1245 - - - - - - 96 %   02/26/18 1230 - - - - - - 95 %   02/26/18 1215 - - - - - - 96 %   02/26/18 1200 - - - - - - 94 %   02/26/18 1145 - - - - - - 96 %   02/26/18 1130 - - - - - - 97 %   02/26/18 1115 - - - - - - 95 %   02/26/18 1100 - - - - - - 97 %   02/26/18 1045 - - - - - - 95 %   02/26/18 0915 - - - - - - 100 %   02/26/18 0815 159/78 - - - - - 100 %   02/26/18 0810 (!) 168/98 98.3  F (36.8  C) Oral - 74 18 100 %       Physical Exam  General: Alert, appears well-developed and well-nourished. Cooperative.     In moderate distress  HEENT:  Head:  Atraumatic  Ears:  External ears are normal  Mouth/Throat:  Oropharynx is without erythema or exudate and mucous membranes are moist.   Eyes:   Conjunctivae normal and EOM are normal. No scleral icterus.    Pupils are equal, round, and reactive to light.   Neck:   Normal range of motion. Neck supple.  CV:  Normal rate, regular rhythm, normal heart sounds and radial pulses are 2+ and symmetric.  No murmur.  Resp:  Breath sounds are clear bilaterally    Non-labored, no retractions or accessory muscle use  GI:  Abdomen is soft, no distension, no tenderness. No rebound or guarding.  No CVA tenderness bilaterally  MS:  Normal range of motion. No edema.    Normal strength in all 4 extremities.     Back atraumatic.    No midline cervical, thoracic, or lumbar tenderness  Skin:  Warm and dry.  No rash or lesions  noted.  Neuro:  Alert. Normal strength.  Sensation intact in all 4 extremities. GCS: 15    Cranial nerves 2-12 intact.  Psych:  Depressed mood. Appears anxious and tearful.      Emergency Department Course     Laboratory:  CBC: WBC: 10.4, HGB: 14.4, PLT: 316  BMP: Glucose 121 (H), o/w WNL (Creatinine: 0.79)    Interventions:  0906 0.9% Sodium Chloride 1,000 mL IV  0907 Benadryl 25 mg IV  0908 Compazine 10 mg IV  0948 Reglan 10 mg IV  1124 0.9% Sodium Chloride 1,000 mL IV   Zyprexa 2.5 mg IV    Emergency Department Course:  Nursing notes and vitals reviewed.  0804 I performed an exam of the patient as documented above.  Blood drawn. This was sent to the lab for further testing, results above.  IV inserted. Medicine administered as documented above.     0946 The patient reports minimal relief with Compazine.  1103 I reevaluated the patient and provided an update in regards to her ED course.    1218 The patient reports slight relief with Zyprexa but her headache persists. We discussed admission  1324 I consulted with hospitalist Dr. Beltran about the patient's symptoms, history and workup here in the emergency department. Dr. Beltran agrees to accept the patient.    Findings and plan explained to the patient who consents to admission. Discussed the patient with Dr. Beltran, who will admit the patient to an observation  bed for further monitoring, evaluation, and treatment.      Impression & Plan      Medical Decision Making:  Barbara Smiley is a 52 year old female with a recent medical history for new onset headaches this month. She was admitted within the last week due to intractable headaches. She had a broad workup performed during that time including CT Head/Neck Angiogram as well as CT scan of the head. She also had an MR brain w/ and without contrast as well as a lumbar puncture which showed no concerning findings for infection or for masses intracranially. The patient had no evidence of subarachnoid  hemorrhage or intracranial bleed during that admission. The patient's headache does appear similar to that prior presentation. I do not feel that she would benefit from additional imaging or procedural interventions such as repeat lumbar puncture today. She took a Sumatriptan prior to arrival with no improvement as well as some Excedrin. We started with Compazine and Benadryl which provided minimal relief. In the next hour we have provided a 10 mg dose of Reglan which helped minimally. Her pain was now down to about a 6/10. We did  give IV Zyprexa with 2.5 mg dose to assist with her intractable headache symptoms. The Zyprexa seemed to help most out of the different headache cocktails we had provided. This morning her pain is now down to a 3-4/10. The patient does not feel comfortable going home as her headache has not cleared and she has no additional medications at home beyond the Imitrex. She does not follow up with neurology regularly although neurology did see her briefly during her prior admission. She does not have a clear plan of what to do if her headaches return as she has had no relief with outpatient medication that has been provided. I opted to hold off on providing opiate medications as I felt this would not help her headache symptoms and certainly would not be a good long term solution for her headaches. We did try some oxygen with nasal cannula with no improvement in HA symptoms. The patient's case was discussed with Dr. Beltran who agreed to observation and admission at this time for intractable headaches. The patient has normal appearing labs here and has been rehydrated with IV fluids while in the ED. The patient agreed to admission.     Diagnosis:    ICD-10-CM    1. Tension headache G44.209 Erythrocyte sedimentation rate auto     Erythrocyte sedimentation rate auto     TSH with free T4 reflex     T4 free     T4 free     CANCELED: Erythrocyte sedimentation rate auto     CANCELED: Erythrocyte  sedimentation rate auto     Disposition:  Admitted.     Scribe Discloser:  I, Saeed Kendall, am serving as a scribe on 2/26/2018 at 8:04 AM to personally document services performed by No att. providers found based on my observations and the provider's statements to me.     2/26/2018   [facility] EMERGENCY DEPARTMENT       Joey Fuller MD  02/26/18 5007

## 2018-02-26 NOTE — PLAN OF CARE
Problem: Patient Care Overview  Goal: Plan of Care/Patient Progress Review  ROOM # 226    Living Situation (if not independent, order SW consult): Independent  Facility name: NA  : [facility] - [phone]    Activity level at baseline: Ind  Activity level on admit: Ind/SBA      Patient registered to observation; given Patient Bill of Rights; given the opportunity to ask questions about observation status and their plan of care.  Patient has been oriented to the observation room, bathroom and call light is in place.    Discussed discharge goals and expectations with patient/family.

## 2018-02-26 NOTE — IP AVS SNAPSHOT
MRN:[id number]                      After Visit Summary   2/26/2018    Barbara Smiley    MRN: [id number]           Thank you!     Thank you for choosing [facility] for your care. Our goal is always to provide you with excellent care. Hearing back from our patients is one way we can continue to improve our services. Please take a few minutes to complete the written survey that you may receive in the mail after you visit. If you would like to speak to someone directly about your visit please contact Patient Relations at [phone]. Thank you!          Patient Information     Date Of Birth          1965        About your hospital stay     You were admitted on:  February 26, 2018 You last received care in the:  [facility] Observation Department    You were discharged on:  February 27, 2018        Reason for your hospital stay       Intractable headache. Work up has been negative including CT head, CT angio head and neck, MRI brain, MR venogram of the brain and lumbar puncture. Headache improved with a combination of medications. Recommend follow up with PT.                  Who to Call     For medical emergencies, please call 911.  For non-urgent questions about your medical care, please call your primary care provider or clinic, [phone]          Attending Provider     Provider Specialty    Joey Fuller MD Emergency Medicine    Marshall Beltran MD Internal Medicine       Primary Care Provider Office Phone # Fax #    Ijeoma Finn [phone]       When to contact your care team       Call your primary doctor if you have any of the following: temperature greater than 101, increased headache or neurological deficits.                  After Care Instructions     Activity       Your activity upon discharge: activity as tolerated            Diet       Follow this diet upon discharge: Regular                  Follow-up Appointments     Follow-up  and recommended labs and tests        Follow up with primary care provider, Ijeoma Finn, within 7 days for hospital follow- up.  No follow up labs or test are needed.  Consider follow up with neurology if not improved.  Follow up with endocrinology to discuss TSH and T4.   Outpatient physical therapy - Appt tomorrow 2/28 10:25 at [facility] in [place], [address] [facility] [address]  Continue amitriptyline every evening.   Maxalt as needed at the onset of headache, may repeat dose once. Use no more than 3 times per week  Continue Tizanidine as needed for muscle relaxer.   Complete Prednisone taper.  Oxycodone available as last resort for pain                  Your next 10 appointments already scheduled     Feb 28, 2018 10:40 AM CST   (Arrive by 10:25 AM)   [facility] with Evelia Thornton, PT   [facility] [place] [facility] ([facility])    [address]  [facility] [address] [phone]              Additional Services     PHYSICAL THERAPY REFERRAL       *This therapy referral will be filtered to a centralized scheduling office at [facility] and the patient will receive a call to schedule an appointment at a [facility] location most convenient for them. *     [facility] provides Physical Therapy evaluation and treatment and many specialty services across the [facility] system.  If requesting a specialty program, please choose from the list below.    If you have not heard from the scheduling office within 2 business days, please call [phone] for all locations, with the exception of [place], please call [phone] and [facility], please call [phone]  Treatment: Evaluation & Treatment  Special Instructions/Modalities: na  Special Programs: headaches    Please be aware that coverage of these services is subject to the terms and limitations of your health insurance plan.  Call member services at your health plan with any benefit or coverage  "questions.      **Note to Provider:  If you are referring outside of [facility] for the therapy appointment, please list the name of the location in the \"special instructions\" above, print the referral and give to the patient to schedule the appointment.                             Pending Results     No orders found for last 3 day(s).            Statement of Approval     Ordered          02/27/18 1423  I have reviewed and agree with all the recommendations and orders detailed in this document.  EFFECTIVE NOW     Approved and electronically signed by:  Rhoda Newsome PA-C             Admission Information     Date & Time Provider Department Dept. Phone    2/26/2018 Marshall Beltran MD [facility] Observation Department [phone]      Your Vitals Were     Blood Pressure Pulse Temperature Respirations Last Period Pulse Oximetry    150/64 (BP Location: Right arm) 74 96.4  F (35.8  C) (Oral) 20 08/01/2007 95%      MyChart Information     [facility] gives you secure access to your electronic health record. If you see a primary care provider, you can also send messages to your care team and make appointments. If you have questions, please call your primary care clinic.  If you do not have a primary care provider, please call [phone] and they will assist you.        Care EveryWhere ID     This is your Care EveryWhere ID. This could be used by other organizations to access your [facility] medical records  [id number]        Equal Access to Services     NEHEMIAS LUGO : Hadii marion hood Somarky, waaxda luqadaha, qaybta kaalmada adeegyada, coy chaney . So [facility] [phone].    ATENCIÓN: Si habla español, tiene a barnett disposición servicios gratuitos de asistencia lingüística. Llame al [phone].    We comply with applicable federal civil rights laws and [place] laws. We do not discriminate on the basis of race, color, national origin, age, disability, sex, sexual " orientation, or gender identity.               Review of your medicines      START taking        Dose / Directions    amitriptyline 25 MG tablet   Commonly known as:  ELAVIL        Dose:  25 mg   Take 1 tablet (25 mg) by mouth At Bedtime   Quantity:  30 tablet   Refills:  0       oxyCODONE IR 5 MG tablet   Commonly known as:  ROXICODONE        Dose:  5 mg   Take 1 tablet (5 mg) by mouth every 3 hours as needed for other (pain control)   Quantity:  10 tablet   Refills:  0       predniSONE 20 MG tablet   Commonly known as:  DELTASONE        Dose:  60 mg   Start taking on:  2/28/2018   Take 3 tablets (60 mg) by mouth daily For 1 day, 40 mg per day for 2 days and 20 mg per day for 2 days.   Quantity:  9 tablet   Refills:  0       rizatriptan 10 MG tablet   Commonly known as:  MAXALT        Dose:  10 mg   Take 1 tablet (10 mg) by mouth at onset of headache for migraine May repeat in 2 hours. Max 3 tablets/24 hours.   Quantity:  10 tablet   Refills:  1       tiZANidine 2 MG tablet   Commonly known as:  ZANAFLEX        Dose:  2 mg   Take 1 tablet (2 mg) by mouth every 6 hours as needed for muscle spasms (tension headache)   Quantity:  20 tablet   Refills:  0         CONTINUE these medicines which have NOT CHANGED        Dose / Directions    aspirin-acetaminophen-caffeine 250-250-65 MG per tablet   Commonly known as:  EXCEDRIN MIGRAINE        Dose:  1 tablet   Take 1 tablet by mouth every 6 hours as needed for headaches   Quantity:  80 tablet   Refills:  0       atorvastatin 10 MG tablet   Commonly known as:  LIPITOR   Used for:  Hyperlipidemia LDL goal <130        Dose:  10 mg   Take 1 tablet (10 mg) by mouth daily   Quantity:  90 tablet   Refills:  1       calcium-vitamin D-vitamin K 500-500-40 MG-UNT-MCG Chew   Commonly known as:  VIACTIV        Dose:  2 tablet   Take 2 tablets by mouth daily In the Afternoon   Refills:  0       FISH OIL ADULT GUMMIES PO        Dose:  2 chew tab   Take 2 chew tab by mouth every morning    Refills:  0       IBUPROFEN PO        Dose:  200 mg   Take 200 mg by mouth every 6 hours as needed for moderate pain   Refills:  0       LEVOTHYROXINE SODIUM PO        Dose:  88 mcg   Take 88 mcg by mouth daily   Refills:  0       MULTIVITAMIN ADULT Chew        Dose:  2 chew tab   Take 2 chew tab by mouth daily   Refills:  0         STOP taking     gabapentin 300 MG capsule   Commonly known as:  NEURONTIN           IMITREX PO                Where to get your medicines      These medications were sent to [place], [place] - [address]  [address] [facility] [address]     Phone:  [phone]     amitriptyline 25 MG tablet    predniSONE 20 MG tablet    rizatriptan 10 MG tablet    tiZANidine 2 MG tablet         Some of these will need a paper prescription and others can be bought over the counter. Ask your nurse if you have questions.     Bring a paper prescription for each of these medications     oxyCODONE IR 5 MG tablet                Protect others around you: Learn how to safely use, store and throw away your medicines at www.disposemymeds.org.        Information about OPIOIDS     PRESCRIPTION OPIOIDS: WHAT YOU NEED TO KNOW    Prescription opioids can be used to help relieve moderate to severe pain and are often prescribed following a surgery or injury, or for certain health conditions. These medications can be an important part of treatment but also come with serious risks. It is important to work with your health care provider to make sure you are getting the safest, most effective care.    WHAT ARE THE RISKS AND SIDE EFFECTS OF OPIOID USE?  Prescription opioids carry serious risks of addiction and overdose, especially with prolonged use. An opioid overdose, often marked by slowed breathing can cause sudden death. The use of prescription opioids can have a number of side effects as well, even when taken as directed:      Tolerance - meaning you might need to  take more of a medication for the same pain relief    Physical dependence - meaning you have symptoms of withdrawal when a medication is stopped    Increased sensitivity to pain    Constipation    Nausea, vomiting, and dry mouth    Sleepiness and dizziness    Confusion    Depression    Low levels of testosterone that can result in lower sex drive, energy, and strength    Itching and sweating    RISKS ARE GREATER WITH:    History of drug misuse, substance use disorder, or overdose    Mental health conditions (such as depression or anxiety)    Sleep apnea    Older age (65 years or older)    Pregnancy    Avoid alcohol while taking prescription opioids.   Also, unless specifically advised by your health care provider, medications to avoid include:    Benzodiazepines (such as Xanax or Valium)    Muscle relaxants (such as Soma or Flexeril)    Hypnotics (such as Ambien or Lunesta)    Other prescription opioids    KNOW YOUR OPTIONS:  Talk to your health care provider about ways to manage your pain that do not involve prescription opioids. Some of these options may actually work better and have fewer risks and side effects:    Pain relievers such as acetaminophen, ibuprofen, and naproxen    Some medications that are also used for depression or seizures    Physical therapy and exercise    Cognitive behavioral therapy, a psychological, goal-directed approach, in which patients learn how to modify physical, behavioral, and emotional triggers of pain and stress    IF YOU ARE PRESCRIBED OPIOIDS FOR PAIN:    Never take opioids in greater amounts or more often than prescribed    Follow up with your primary health care provider and work together to create a plan on how to manage your pain.    Talk about ways to help manage your pain that do not involve prescription opioids    Talk about all concerns and side effects    Help prevent misuse and abuse    Never sell or share prescription opioids    Never use another person's  prescription opioids    Store prescription opioids in a secure place and out of reach of others (this may include visitors, children, friends, and family)    Visit www.cdc.gov/drugoverdose to learn about risks of opioid abuse and overdose    If you believe you may be struggling with addiction, tell your health care provider and ask for guidance or call [facility]'s National Helpline at 6-535-283-HELP    LEARN MORE / www.cdc.gov/drugoverdose/prescribing/guideline.html    Safely dispose of unused prescription opioids: Find your local drug take-back programs and more information about the importance of safe disposal at www.doseofreality.[place].gov             Medication List: This is a list of all your medications and when to take them. Check marks below indicate your daily home schedule. Keep this list as a reference.      Medications           Morning Afternoon Evening Bedtime As Needed    amitriptyline 25 MG tablet   Commonly known as:  ELAVIL   Take 1 tablet (25 mg) by mouth At Bedtime   Last time this was given:  25 mg on 2/26/2018  9:29 PM                                aspirin-acetaminophen-caffeine 250-250-65 MG per tablet   Commonly known as:  EXCEDRIN MIGRAINE   Take 1 tablet by mouth every 6 hours as needed for headaches                                atorvastatin 10 MG tablet   Commonly known as:  LIPITOR   Take 1 tablet (10 mg) by mouth daily                                calcium-vitamin D-vitamin K 500-500-40 MG-UNT-MCG Chew   Commonly known as:  VIACTIV   Take 2 tablets by mouth daily In the Afternoon                                FISH OIL ADULT GUMMIES PO   Take 2 chew tab by mouth every morning                                IBUPROFEN PO   Take 200 mg by mouth every 6 hours as needed for moderate pain                                LEVOTHYROXINE SODIUM PO   Take 88 mcg by mouth daily   Last time this was given:  88 mcg on 2/27/2018  7:44 AM                                MULTIVITAMIN ADULT Chew   Take 2 chew  tab by mouth daily                                oxyCODONE IR 5 MG tablet   Commonly known as:  ROXICODONE   Take 1 tablet (5 mg) by mouth every 3 hours as needed for other (pain control)   Last time this was given:  5 mg on 2/27/2018  9:18 AM                                predniSONE 20 MG tablet   Commonly known as:  DELTASONE   Take 3 tablets (60 mg) by mouth daily For 1 day, 40 mg per day for 2 days and 20 mg per day for 2 days.   Start taking on:  2/28/2018   Last time this was given:  60 mg on 2/27/2018 10:08 AM                                rizatriptan 10 MG tablet   Commonly known as:  MAXALT   Take 1 tablet (10 mg) by mouth at onset of headache for migraine May repeat in 2 hours. Max 3 tablets/24 hours.                                tiZANidine 2 MG tablet   Commonly known as:  ZANAFLEX   Take 1 tablet (2 mg) by mouth every 6 hours as needed for muscle spasms (tension headache)                                          More Information        Patient Education    Prednisone Gastro-resistant tablet    Prednisone Oral solution    Prednisone Oral tablet  Prednisone Oral tablet  What is this medicine?  PREDNISONE (PRED ni sone) is a corticosteroid. It is commonly used to treat inflammation of the skin, joints, lungs, and other organs. Common conditions treated include asthma, allergies, and arthritis. It is also used for other conditions, such as blood disorders and diseases of the adrenal glands.  This medicine may be used for other purposes; ask your health care provider or pharmacist if you have questions.  What should I tell my health care provider before I take this medicine?  They need to know if you have any of these conditions:    Cushing's syndrome    diabetes    glaucoma    heart disease    high blood pressure    infection (especially a virus infection such as chickenpox, cold sores, or herpes)    kidney disease    liver disease    mental illness    myasthenia  gravis    osteoporosis    seizures    stomach or intestine problems    thyroid disease    an unusual or allergic reaction to lactose, prednisone, other medicines, foods, dyes, or preservatives    pregnant or trying to get pregnant    breast-feeding  How should I use this medicine?  Take this medicine by mouth with a glass of water. Follow the directions on the prescription label. Take this medicine with food. If you are taking this medicine once a day, take it in the morning. Do not take more medicine than you are told to take. Do not suddenly stop taking your medicine because you may develop a severe reaction. Your doctor will tell you how much medicine to take. If your doctor wants you to stop the medicine, the dose may be slowly lowered over time to avoid any side effects.  Talk to your pediatrician regarding the use of this medicine in children. Special care may be needed.  Overdosage: If you think you have taken too much of this medicine contact a poison control center or emergency room at once.  NOTE: This medicine is only for you. Do not share this medicine with others.  What if I miss a dose?  If you miss a dose, take it as soon as you can. If it is almost time for your next dose, talk to your doctor or health care professional. You may need to miss a dose or take an extra dose. Do not take double or extra doses without advice.  What may interact with this medicine?  Do not take this medicine with any of the following medications:    metyrapone    mifepristone  This medicine may also interact with the following medications:    aminoglutethimide    amphotericin B    aspirin and aspirin-like medicines    barbiturates    certain medicines for diabetes, like glipizide or glyburide    cholestyramine    cholinesterase inhibitors    cyclosporine    digoxin    diuretics    ephedrine    female hormones, like estrogens and birth control pills    isoniazid    ketoconazole    NSAIDS, medicines for pain and inflammation,  like ibuprofen or naproxen    phenytoin    rifampin    toxoids    vaccines    warfarin  This list may not describe all possible interactions. Give your health care provider a list of all the medicines, herbs, non-prescription drugs, or dietary supplements you use. Also tell them if you smoke, drink alcohol, or use illegal drugs. Some items may interact with your medicine.  What should I watch for while using this medicine?  Visit your doctor or health care professional for regular checks on your progress. If you are taking this medicine over a prolonged period, carry an identification card with your name and address, the type and dose of your medicine, and your doctor's name and address.  This medicine may increase your risk of getting an infection. Tell your doctor or health care professional if you are around anyone with measles or chickenpox, or if you develop sores or blisters that do not heal properly.  If you are going to have surgery, tell your doctor or health care professional that you have taken this medicine within the last twelve months.  Ask your doctor or health care professional about your diet. You may need to lower the amount of salt you eat.  This medicine may affect blood sugar levels. If you have diabetes, check with your doctor or health care professional before you change your diet or the dose of your diabetic medicine.  What side effects may I notice from receiving this medicine?  Side effects that you should report to your doctor or health care professional as soon as possible:    allergic reactions like skin rash, itching or hives, swelling of the face, lips, or tongue    changes in emotions or moods    changes in vision    depressed mood    eye pain    fever or chills, cough, sore throat, pain or difficulty passing urine    increased thirst    swelling of ankles, feet  Side effects that usually do not require medical attention (report to your doctor or health care professional if they  continue or are bothersome):    confusion, excitement, restlessness    headache    nausea, vomiting    skin problems, acne, thin and shiny skin    trouble sleeping    weight gain  This list may not describe all possible side effects. Call your doctor for medical advice about side effects. You may report side effects to FDA at 1-149-SNB-9479.  Where should I keep my medicine?  Keep out of the reach of children.  Store at room temperature between 15 and 30 degrees C (59 and 86 degrees F). Protect from light. Keep container tightly closed. Throw away any unused medicine after the expiration date.  NOTE:This sheet is a summary. It may not cover all possible information. If you have questions about this medicine, talk to your doctor, pharmacist, or health care provider. Copyright  2016 [employer] Standard                Patient Education    Rizatriptan Benzoate Oral disintegrating tablet    Rizatriptan Benzoate Oral tablet  Rizatriptan Benzoate Oral tablet  What is this medicine?  RIZATRIPTAN (rye za TRIP tan) is used to treat migraines with or without aura. An aura is a strange feeling or visual disturbance that warns you of an attack. It is not used to prevent migraines.  This medicine may be used for other purposes; ask your health care provider or pharmacist if you have questions.  What should I tell my health care provider before I take this medicine?  They need to know if you have any of these conditions:    bowel disease or colitis    diabetes    family history of heart disease    fast or irregular heart beat    heart or blood vessel disease, angina (chest pain), or previous heart attack    high blood pressure    high cholesterol    history of stroke, transient ischemic attacks (TIAs or mini-strokes), or intracranial bleeding    kidney or liver disease    overweight    poor circulation    postmenopausal or surgical removal of uterus and ovaries    Raynaud's disease    seizure disorder    an unusual or allergic reaction  to rizatriptan, other medicines, foods, dyes, or preservatives    pregnant or trying to get pregnant    breast-feeding  How should I use this medicine?  This medicine is taken by mouth with a glass of water. Follow the directions on the prescription label. This medicine is taken at the first symptoms of a migraine. It is not for everyday use. If your migraine headache returns after one dose, you can take another dose as directed. You must leave at least 2 hours between doses, and do not take more than 30 mg total in 24 hours. If there is no improvement at all after the first dose, do not take a second dose without talking to your doctor or health care professional. Do not take your medicine more often than directed.  Talk to your pediatrician regarding the use of this medicine in children. While this drug may be prescribed for children as young as 6 years for selected conditions, precautions do apply.  Overdosage: If you think you have taken too much of this medicine contact a poison control center or emergency room at once.  NOTE: This medicine is only for you. Do not share this medicine with others.  What if I miss a dose?  This does not apply; this medicine is not for regular use.  What may interact with this medicine?  Do not take this medicine with any of the following medicines:    amphetamine, dextroamphetamine or cocaine    dihydroergotamine, ergotamine, ergoloid mesylates, methysergide, or ergot-type medication - do not take within 24 hours of taking rizatriptan    feverfew    MAOIs like Carbex, Eldepryl, Marplan, Nardil, and Parnate - do not take rizatriptan within 2 weeks of stopping MAOI therapy.    other migraine medicines like almotriptan, eletriptan, naratriptan, sumatriptan, zolmitriptan - do not take within 24 hours of taking rizatriptan    tryptophan  This medicine may also interact with the following medications:    medicines for mental depression, anxiety or mood problems    propranolol  This  list may not describe all possible interactions. Give your health care provider a list of all the medicines, herbs, non-prescription drugs, or dietary supplements you use. Also tell them if you smoke, drink alcohol, or use illegal drugs. Some items may interact with your medicine.  What should I watch for while using this medicine?  Only take this medicine for a migraine headache. Take it if you get warning symptoms or at the start of a migraine attack. It is not for regular use to prevent migraine attacks.  You may get drowsy or dizzy. Do not drive, use machinery, or do anything that needs mental alertness until you know how this medicine affects you. To reduce dizzy or fainting spells, do not sit or stand up quickly, especially if you are an older patient. Alcohol can increase drowsiness, dizziness and flushing. Avoid alcoholic drinks.  Smoking cigarettes may increase the risk of heart-related side effects from using this medicine.  If you take migraine medicines for 10 or more days a month, your migraines may get worse. Keep a diary of headache days and medicine use. Contact your healthcare professional if your migraine attacks occur more frequently.  What side effects may I notice from receiving this medicine?  Side effects that you should report to your doctor or health care professional as soon as possible:    allergic reactions like skin rash, itching or hives, swelling of the face, lips, or tongue    fast, slow, or irregular heart beat    increased or decreased blood pressure    seizures    severe stomach pain and cramping, bloody diarrhea    signs and symptoms of a blood clot such as breathing problems; changes in vision; chest pain; severe, sudden headache; pain, swelling, warmth in the leg; trouble speaking; sudden numbness or weakness of the face, arm or leg    tingling, pain, or numbness in the face, hands, or feet  Side effects that usually do not require medical attention (report to your doctor or  health care professional if they continue or are bothersome):    drowsiness    dry mouth    feeling warm, flushing, or redness of the face    headache    muscle cramps, pain    nausea, vomiting    unusually weak or tired  This list may not describe all possible side effects. Call your doctor for medical advice about side effects. You may report side effects to FDA at 0-638-BRO-7342.  Where should I keep my medicine?  Keep out of the reach of children.  Store at room temperature between 15 and 30 degrees C (59 and 86 degrees F). Keep container tightly closed. Throw away any unused medicine after the expiration date.  NOTE:This sheet is a summary. It may not cover all possible information. If you have questions about this medicine, talk to your doctor, pharmacist, or health care provider. Copyright  2016 [employer]                Patient Education    Tizanidine Hydrochloride Oral capsule    Tizanidine Hydrochloride Oral tablet  Tizanidine Hydrochloride Oral tablet  What is this medicine?  TIZANIDINE (nam JERMAIN i kb) helps to relieve muscle spasms. It may be used to help in the treatment of multiple sclerosis and spinal cord injury.  This medicine may be used for other purposes; ask your health care provider or pharmacist if you have questions.  What should I tell my health care provider before I take this medicine?  They need to know if you have any of these conditions:    kidney disease    liver disease    low blood pressure    mental disorder    an unusual or allergic reaction to tizanidine, other medicines, lactose (tablets only), foods, dyes, or preservatives    pregnant or trying to get pregnant    breast-feeding  How should I use this medicine?  Take this medicine by mouth with a full glass of water. Take this medicine on an empty stomach, at least 30 minutes before or 2 hours after food. Do not take with food unless you talk with your doctor. Follow the directions on the prescription label. Take your  medicine at regular intervals. Do not take your medicine more often than directed. Do not stop taking except on your doctor's advice. Suddenly stopping the medicine can be very dangerous.  Talk to your pediatrician regarding the use of this medicine in children.  Patients over 65 years old may have a stronger reaction and need a smaller dose.  Overdosage: If you think you have taken too much of this medicine contact a poison control center or emergency room at once.  NOTE: This medicine is only for you. Do not share this medicine with others.  What if I miss a dose?  If you miss a dose, take it as soon as you can. If it is almost time for your next dose, take only that dose. Do not take double or extra doses.  What may interact with this medicine?  Do not take this medicine with any of the following medications:    ciprofloxacin    clonidine    fluvoxamine    guanabenz    guanfacine    methyldopa  This medicine may also interact with the following medications:    acyclovir    alcohol    antihistamines    baclofen    barbiturates like phenobarbital    benzodiazepines    cimetidine    famotidine    female hormones, like estrogens or progestins and birth control pills    medicines for high blood pressure    medicines for irregular heartbeat    medicines for pain like codeine, morphine, and hydrocodone    medicines for sleep    rofecoxib    some antibiotics like levofloxacin, ofloxacin    ticlopidine    zileuton  This list may not describe all possible interactions. Give your health care provider a list of all the medicines, herbs, non-prescription drugs, or dietary supplements you use. Also tell them if you smoke, drink alcohol, or use illegal drugs. Some items may interact with your medicine.  What should I watch for while using this medicine?  You may get drowsy or dizzy. Do not drive, use machinery, or do anything that needs mental alertness until you know how this medicine affects you. Do not stand or sit up  quickly, especially if you are an older patient. This reduces the risk of dizzy or fainting spells. Alcohol may interfere with the effect of this medicine. Avoid alcoholic drinks.  Your mouth may get dry. Chewing sugarless gum or sucking hard candy, and drinking plenty of water may help. Contact your doctor if the problem does not go away or is severe.  What side effects may I notice from receiving this medicine?  Side effects that you should report to your doctor or health care professional as soon as possible:    allergic reactions like skin rash, itching or hives, swelling of the face, lips, or tongue    blurred vision    fainting spells    hallucinations    nausea or vomiting    nervousness    redness, blistering, peeling or loosening of the skin, including inside the mouth    slow or irregular heartbeat, palpitations, or chest pain    yellowing of the skin or eyes  Side effects that usually do not require medical attention (report to your doctor or health care professional if they continue or are bothersome):    dizziness    drowsiness    dry mouth    tiredness or weakness  This list may not describe all possible side effects. Call your doctor for medical advice about side effects. You may report side effects to FDA at 4-747-FDA-8656.  Where should I keep my medicine?  Keep out of the reach of children.  Store at room temperature between 15 and 30 degrees C (59 and 86 degrees F). Throw away any unused medicine after the expiration date.  NOTE:This sheet is a summary. It may not cover all possible information. If you have questions about this medicine, talk to your doctor, pharmacist, or health care provider. Copyright  2016 [employer]                Amitriptyline tablets  Brand Names: Elavil, Vanatrip  What is this medicine?  AMITRIPTYLINE (a shital TRIP ti chelo) is used to treat depression.  How should I use this medicine?  Take this medicine by mouth with a drink of water. Follow the directions on the  prescription label. You can take the tablets with or without food. Take your medicine at regular intervals. Do not take it more often than directed. Do not stop taking this medicine suddenly except upon the advice of your doctor. Stopping this medicine too quickly may cause serious side effects or your condition may worsen.  A special MedGuide will be given to you by the pharmacist with each prescription and refill. Be sure to read this information carefully each time.  Talk to your pediatrician regarding the use of this medicine in children. Special care may be needed.  What side effects may I notice from receiving this medicine?  Side effects that you should report to your doctor or health care professional as soon as possible:    allergic reactions like skin rash, itching or hives, swelling of the face, lips, or tongue    anxious    breathing problems    changes in vision    confusion    elevated mood, decreased need for sleep, racing thoughts, impulsive behavior    eye pain    fast, irregular heartbeat    feeling faint or lightheaded, falls    feeling agitated, angry, or irritable    fever with increased sweating    hallucination, loss of contact with reality    seizures    stiff muscles    suicidal thoughts or other mood changes    tingling, pain, or numbness in the feet or hands    trouble passing urine or change in the amount of urine    trouble sleeping    unusually weak or tired    vomiting    yellowing of the eyes or skin  Side effects that usually do not require medical attention (report to your doctor or health care professional if they continue or are bothersome):    change in sex drive or performance    change in appetite or weight    constipation    dizziness    dry mouth    nausea    tired    tremors    upset stomach  What may interact with this medicine?  Do not take this medicine with any of the following medications:    arsenic trioxide    certain medicines used to regulate abnormal heartbeat or  to treat other heart conditions    cisapride    droperidol    halofantrine    linezolid    MAOIs like Carbex, Eldepryl, Marplan, Nardil, and Parnate    methylene blue    other medicines for mental depression    phenothiazines like perphenazine, thioridazine and chlorpromazine    pimozide    probucol    procarbazine    sparfloxacin    Liborio Negron Torres's Wort    ziprasidone  This medicine may also interact with the following medications:    atropine and related drugs like hyoscyamine, scopolamine, tolterodine and others    barbiturate medicines for inducing sleep or treating seizures, like phenobarbital    cimetidine    disulfiram    ethchlorvynol    thyroid hormones such as levothyroxine  What if I miss a dose?  If you miss a dose, take it as soon as you can. If it is almost time for your next dose, take only that dose. Do not take double or extra doses.  Where should I keep my medicine?  Keep out of the reach of children.  Store at room temperature between 20 and 25 degrees C (68 and 77 degrees F). Throw away any unused medicine after the expiration date.  What should I tell my health care provider before I take this medicine?  They need to know if you have any of these conditions:    an alcohol problem    asthma, difficulty breathing    bipolar disorder or schizophrenia    difficulty passing urine, prostate trouble    glaucoma    heart disease or previous heart attack    liver disease    over active thyroid    seizures    thoughts or plans of suicide, a previous suicide attempt, or family history of suicide attempt    an unusual or allergic reaction to amitriptyline, other medicines, foods, dyes, or preservatives    pregnant or trying to get pregnant    breast-feeding  What should I watch for while using this medicine?  Tell your doctor if your symptoms do not get better or if they get worse. Visit your doctor or health care professional for regular checks on your progress. Because it may take several weeks to see the full  effects of this medicine, it is important to continue your treatment as prescribed by your doctor.  Patients and their families should watch out for new or worsening thoughts of suicide or depression. Also watch out for sudden changes in feelings such as feeling anxious, agitated, panicky, irritable, hostile, aggressive, impulsive, severely restless, overly excited and hyperactive, or not being able to sleep. If this happens, especially at the beginning of treatment or after a change in dose, call your health care professional.  You may get drowsy or dizzy. Do not drive, use machinery, or do anything that needs mental alertness until you know how this medicine affects you. Do not stand or sit up quickly, especially if you are an older patient. This reduces the risk of dizzy or fainting spells. Alcohol may interfere with the effect of this medicine. Avoid alcoholic drinks.  Do not treat yourself for coughs, colds, or allergies without asking your doctor or health care professional for advice. Some ingredients can increase possible side effects.  Your mouth may get dry. Chewing sugarless gum or sucking hard candy, and drinking plenty of water will help. Contact your doctor if the problem does not go away or is severe.  This medicine may cause dry eyes and blurred vision. If you wear contact lenses you may feel some discomfort. Lubricating drops may help. See your eye doctor if the problem does not go away or is severe.  This medicine can cause constipation. Try to have a bowel movement at least every 2 to 3 days. If you do not have a bowel movement for 3 days, call your doctor or health care professional.  This medicine can make you more sensitive to the sun. Keep out of the sun. If you cannot avoid being in the sun, wear protective clothing and use sunscreen. Do not use sun lamps or tanning beds/booths.  NOTE:This sheet is a summary. It may not cover all possible information. If you have questions about this  medicine, talk to your doctor, pharmacist, or health care provider. Copyright  2017 [employer]

## 2018-02-26 NOTE — ED NOTES
Pt arrives with c/o HA that started around 0700. States she took her gabapentin and imitrex around 0700 and hasn't had relief. States this feels similar to her previous HAs she's been admitted for. [place] negative. ABC intact.

## 2018-02-26 NOTE — PHARMACY-ADMISSION MEDICATION HISTORY
Admission medication history interview status for this patient is complete. See [facility] admission navigator for allergy information, prior to admission medications and immunization status.     Medication history interview source(s):Patient  Medication history resources (including written lists, pill bottles, clinic record):None  Primary pharmacy:[facility]    Changes made to PTA medication list:  Added: none  Deleted: turmeric  Changed: levothyroxine    Actions taken by pharmacist (provider contacted, etc):None     Additional medication history information:None    Medication reconciliation/reorder completed by provider prior to medication history? No    Do you take OTC medications (eg tylenol, ibuprofen, fish oil, eye/ear drops, etc)? Y(Y/N)    For patients on insulin therapy: N (Y/N)  Lantus/levemir/NPH/Mix 70/30 dose:   (Y/N) (see Med list for doses)   Sliding scale Novolog Y/N  If Yes, do you have a baseline novolog pre-meal dose:  units with meals  Patients eat three meals a day:   Y/N    How many episodes of hypoglycemia do you have per week: _______  How many missed doses do you have per week: ______  How many times do you check your blood glucose per day: _______   Any Barriers to therapy - Be specific :  cost of medications, comfortable with giving injections (if applicable), comfortable and confident with current diabetes regimen: Y/N ______________      Prior to Admission medications    Medication Sig Last Dose Taking? Auth Provider   LEVOTHYROXINE SODIUM PO Take 88 mcg by mouth daily 2/25/2018 at Unknown time Yes Unknown, Entered By History   SUMAtriptan Succinate (IMITREX PO) Take 50 mg by mouth every 8 hours as needed for migraine  Yes Reported, Patient   IBUPROFEN PO Take 200 mg by mouth every 6 hours as needed for moderate pain  Yes Reported, Patient   aspirin-acetaminophen-caffeine (EXCEDRIN MIGRAINE) 250-250-65 MG per tablet Take 1 tablet by mouth every 6 hours as needed for headaches 2/25/2018 at  Unknown time Yes Carlyn Perez PA-C   gabapentin (NEURONTIN) 300 MG capsule Take 1 capsule (300 mg) by mouth At Bedtime 2/25/2018 at Unknown time Yes Carlyn Perez PA-C   calcium-vitamin D-vitamin K (VIACTIV) 500-500-40 MG-UNT-MCG CHEW Take 2 tablets by mouth daily In the Afternoon 2/25/2018 at Unknown time Yes Unknown, Entered By History   Multiple Vitamins-Minerals (MULTIVITAMIN ADULT) CHEW Take 2 chew tab by mouth daily 2/25/2018 at Unknown time Yes Unknown, Entered By History   Omega-3 Fatty Acids (FISH OIL ADULT GUMMIES PO) Take 2 chew tab by mouth every morning 2/25/2018 at Unknown time Yes Unknown, Entered By History   atorvastatin (LIPITOR) 10 MG tablet Take 1 tablet (10 mg) by mouth daily 2/25/2018 at Unknown time Yes Gena Martinez APRN CNP

## 2018-02-26 NOTE — IP AVS SNAPSHOT
[facility] Observation Department    [address]    [facility] [address]    Phone:  [phone]                                       After Visit Summary   2/26/2018    Barbara Smiley    MRN: [id number]           After Visit Summary Signature Page     I have received my discharge instructions, and my questions have been answered. I have discussed any challenges I see with this plan with the nurse or doctor.    ..........................................................................................................................................  Patient/Patient Representative Signature      ..........................................................................................................................................  Patient Representative Print Name and Relationship to Patient    ..................................................               ................................................  Date                                            Time    ..........................................................................................................................................  Reviewed by Signature/Title    ...................................................              ..............................................  Date                                                            Time

## 2018-02-26 NOTE — H&P
History and Physical     Barbara Smiley MRN# [id number]   YOB: 1965 Age: 52 year old      Date of Admission:  2/26/2018    Primary care provider: Ijeoma Finn          Assessment and Plan:   Barbara Smiley is a 52 year old female with a PMH significant for recent admission for headache, hypothyroidism due to Hashimoto's thyroiditis and HLD who presents with recurrent headache.     Patient was discussed with Dr. Fuller, who was provider in ED. Chart review of ED work up was performed and is as follows: Vitals show temp of 98.3, pulse 74, and pressure 159/78 with spontaneous respirations and no hypoxia. Oxygen was placed due to headache. Labs remarkable for Creatinine 0.79, normal electrolytes, glucose 121, WBC 10.4, Hgb 14.4. Lumbar puncture performed in ED shows 2 RBC, 1 WBC, and a glucose of 80 with normal protein. Chart review of Care Everywhere, previous visits and admissions were also reviewed.    1. Intractable headache--likely cervicogenic  Patient has been having daily frontal headaches for almost 2 weeks that are throbbing in nature. Each day the headaches are lasting longer than the day before despite taking Excedrin migraine, Ibuprofen, Imitex and tylenol. She was admitted on 2/22 for this same headache with negative imaging of her head including CT, CTA and MRI. She was seen by Neurology who requested a lumbar puncture that was performed and negative for meningitis so the thought was her headaches were cervicogenic and she was started on gabapentin and was instructed to follow up with PT. Since discharge on 2/23 she continues to have daily headaches lasting longer each day. She is tearful and can not tolerate it any longer. In ED she received Reglan, Zyprexa and Compazine with minimal improvement so we were asked to admit.  - Neurology consult who recommended MR venogram of brain  -Change gabapentin to Amitriptyline 25 mg daily  -Toradol 30 mg IV q 6 hr  -Tylenol  scheduled  -Tizanidine to relax muscles  -Change Sumatriptan to Rizatriptan  -Added ESR but very young for giant cell arteritis    2. HLD  Holding statin    3. Hypothyroidism due to Hashimoto thyroiditis  Diagnosed in 2007 and follow with Dr. Jose from Endocrinology. Currently on Levothyroxine 88 mcg daily  -Added TSH and T4        Social: No concerns  Code: Discussed with patient and they have chosen Full code  VTE prophylaxis: Ambulation  Disposition: Observation                    Chief Complaint:   Intractable headache         History of Present Illness:   Hx limited as she is in acute pain. Hx obtained from , ED physician and most recent admission.    Barbara Smiley is a 52 year old female who presents with recurrent headache. She states since discharge on 2/23 she has had recurrent daily frontal headaches that she takes Excedrin Migraine, Imitrex and Ibuprofen for with some relief by 1 or 2 pm. Each day since discharge the headache are lasting longer and today she just couldn't take it anymore. She has no specific vision changes but notes neck tenderness. She denies fever, nausea and vomiting.     Prior to her admission she developed a frontal headache on 2/12 that was acute and throbbing in nature that only lasted 4 minutes associated with nausea. On 2/14 she had another similar headache lasting 2 hours and her headache improved with Imitrex at Urgent Care. She was discharged with a prescription for Imitrex and since that time has had frequent frontal headaches that she manages with Imitrex, Excedrin, Ibuprofen and tylenol. She came to the ED on 2/22 because her headache had spread to the occipital area and she was vomiting. There a CT, CTA, and MRI of her brain were done that were negative.  She was seen by Dr. Whaley from neurology who requested a lumbar puncture which was negative and thought symptoms seemed most consistent with cevicogenic headache and she was discharged on gabapentin  and instructed to follow up with PT.              Past Medical History:     Past Medical History:   Diagnosis Date     Chronic lymphocytic thyroiditis     Hashimoto thyroiditis     Esophageal reflux      Urinary tract infection, site not specified     in the past               Past Surgical History:     Past Surgical History:   Procedure Laterality Date     ENT SURGERY  toddler    Tosillectomy     GYN SURGERY  2007    novasure     HYSTEROSCOPY  2008    removal of ovarian cyst and endometriosis                Social History:     Social History     Social History     Marital status:      Spouse name: N/A     Number of children: N/A     Years of education: N/A     Occupational History     Not on file.     Social History Main Topics     Smoking status: Never Smoker     Smokeless tobacco: Never Used     Alcohol use Yes      Comment: a couple drinks per year     Drug use: No     Sexual activity: Yes     Partners: Male     Birth control/ protection: Surgical     Other Topics Concern     Parent/Sibling W/ Cabg, Mi Or Angioplasty Before 65f 55m? No     Social History Narrative               Family History:     Family History   Problem Relation Age of Onset     C.A.D. Mother      balloon surgery     DIABETES Mother      Cardiovascular Mother      atrial fibrillation      Thyroid Disease Mother      CANCER Mother      Esphogeal Cancer     C.A.D. Father      bypass      DIABETES Father      C.A.D. Paternal Grandmother      DIABETES Paternal Grandmother      C.A.D. Paternal Grandfather      DIABETES Paternal Grandfather      Breast Cancer No family hx of      Cancer - colorectal No family hx of      Prostate Cancer No family hx of               Allergies:      Allergies   Allergen Reactions     Sulfa Drugs                Medications:     Prior to Admission medications    Medication Sig Last Dose Taking? Auth Provider   SUMAtriptan Succinate (IMITREX PO) Take 50 mg by mouth every 8 hours as needed for migraine   Reported,  Patient   IBUPROFEN PO Take 200 mg by mouth every 6 hours as needed for moderate pain   Reported, Patient   aspirin-acetaminophen-caffeine (EXCEDRIN MIGRAINE) 250-250-65 MG per tablet Take 1 tablet by mouth every 6 hours as needed for headaches   Carlyn Perez PA-C   gabapentin (NEURONTIN) 300 MG capsule Take 1 capsule (300 mg) by mouth At Bedtime   Carlyn Perez PA-C   calcium-vitamin D-vitamin K (VIACTIV) 500-500-40 MG-UNT-MCG CHEW Take 2 tablets by mouth daily In the Afternoon   Unknown, Entered By History   Multiple Vitamins-Minerals (MULTIVITAMIN ADULT) CHEW Take 2 chew tab by mouth daily   Unknown, Entered By History   Omega-3 Fatty Acids (FISH OIL ADULT GUMMIES PO) Take 2 chew tab by mouth every morning   Unknown, Entered By History   TURMERIC PO Take 1 capsule by mouth daily   Unknown, Entered By History   levothyroxine (SYNTHROID/LEVOTHROID) 88 MCG tablet 1 tab six days/week, 0.5 tab one day/week.  Total weekly dose 6.5 tabs/week.  Patient taking differently: Take 88 mcg by mouth daily 1 tab six days/week, 0.5 tab one day/week.  Total weekly dose 6.5 tabs/week.   Gena Martinez APRN CNP   atorvastatin (LIPITOR) 10 MG tablet Take 1 tablet (10 mg) by mouth daily   Gena Martinez APRN CNP              Review of Systems:   A Comprehensive greater than 10 system review of systems was carried out.  Pertinent positives and negatives are noted above.  Otherwise negative for contributory information.            Physical Exam:   Blood pressure 146/70, pulse 56, temperature 98.3  F (36.8  C), temperature source Oral, resp. rate 18, last menstrual period 08/01/2007, SpO2 97 %, not currently breastfeeding.  Exam:  GENERAL:  Comfortable.  PSYCH: pleasant, oriented, tearful.  HEENT:  PERRLA. Normal conjunctiva, normal hearing, nasal mucosa and Oropharynx are normal.  NECK:  Supple, no neck vein distention, adenopathy or bruits, normal thyroid. Neck muscles very tight.  HEART:  Normal S1, S2 with no  murmur, no pericardial rub, gallops or S3 or S4.  LUNGS:  Clear to auscultation, normal Respiratory effort. No wheezing, rales or ronchi.  ABDOMEN:  Soft, no hepatosplenomegaly, normal bowel sounds. Non-tender, non distended.   EXTREMITIES:  No pedal edema, +2 pulses bilateral and equal.  SKIN:  Dry to touch, No rash, wound or ulcerations.  NEUROLOGIC:  CN 2-12 intact, BL 5/5 symmetric upper and lower extremity strength, sensation is intact with no focal deficits.               Data:       Recent Labs  Lab 02/26/18  0913 02/26/18  0829 02/23/18  0524   WBC 10.4 Canceled, Test credited 12.2*   HGB 14.4 Canceled, Test credited 13.2   HCT 41.4 Canceled, Test credited 39.4   MCV 84 Canceled, Test credited 87    Canceled, Test credited 311       Recent Labs  Lab 02/26/18  0829 02/23/18  0524 02/22/18  1517 02/22/18  1512    137  --  137   POTASSIUM 3.8 4.2  --  3.4   CHLORIDE 108 106  --  105   CO2 24 25  --  23   ANIONGAP 8 6  --  9   * 162*  --  114*   BUN 19 14  --  19   CR 0.79 0.64  --  0.74   GFRESTIMATED 76 >90 76 82   GFRESTBLACK >90 >90 >90 >90   ZECHARIAH 9.2 8.8  --  9.3         No results found for this or any previous visit (from the past 24 hour(s)).      Kay Moreno PA-C

## 2018-02-26 NOTE — ED NOTES
[facility]  ED Nurse Handoff Report    Barbara Smiley is a 52 year old female   ED Chief complaint: Headache  . ED Diagnosis:   Final diagnoses:   Tension headache     Allergies:   Allergies   Allergen Reactions     Sulfa Drugs        Code Status: Full Code  Activity level - Baseline/Home:  Independent. Activity Level - Current:   Independent. Lift room needed: No. Bariatric: No   Needed: No   Isolation: No. Infection: Not Applicable.     Vital Signs:   Vitals:    02/26/18 1230 02/26/18 1245 02/26/18 1300 02/26/18 1315   BP:    146/70   Pulse:    56   Resp:    18   Temp:       TempSrc:       SpO2: 95% 96% 97% 97%       Cardiac Rhythm:  ,      Pain level: 0-10 Pain Scale: 3  Patient confused: No. Patient Falls Risk: Yes.   Elimination Status: Has voided   Patient Report - Initial Complaint: Pt arrives with c/o HA that started around 0700. States she took her gabapentin and imitrex around 0700 and hasn't had relief. States this feels similar to her previous HAs she's been admitted for. [place] negative. ABC intact. Focused Assessment: generalized HA. Denies photophobia/visual changes, neck pain. Some nausea, no vomiting. Pt recently admitted for HAs. Had full work up done, all negative.    Tests Performed: labs. Abnormal Results: n/a.   Treatments provided: IVF, benadryl, compazine, reglan, zyprexa IV  Family Comments:  at bedside  OBS brochure/video discussed/provided to patient:  Yes  ED Medications:   Medications   OLANZapine (zyPREXA) injection 2.5 mg (2.5 mg Intramuscular Given 2/26/18 1124)   0.9% sodium chloride BOLUS (0 mLs Intravenous Stopped 2/26/18 1125)   prochlorperazine (COMPAZINE) injection 10 mg (10 mg Intravenous Given 2/26/18 0908)   diphenhydrAMINE (BENADRYL) injection 25 mg (25 mg Intravenous Given 2/26/18 0907)   metoclopramide (REGLAN) injection 10 mg (10 mg Intravenous Given 2/26/18 0948)   0.9% sodium chloride BOLUS (1,000 mLs Intravenous New Bag  2/26/18 1124)     Drips infusing:  No  For the majority of the shift, the patient's behavior Green. Interventions performed were n/a.     Severe Sepsis OR Septic Shock Diagnosis Present: No      ED Nurse Name/Phone Number: Omer Kimbrough,   1:33 PM    RECEIVING UNIT ED HANDOFF REVIEW    Above ED Nurse Handoff Report was reviewed: Yes  Reviewed by: Ciara Galicia on February 26, 2018 at 2:02 PM

## 2018-02-27 VITALS
RESPIRATION RATE: 20 BRPM | TEMPERATURE: 96.4 F | OXYGEN SATURATION: 95 % | DIASTOLIC BLOOD PRESSURE: 64 MMHG | SYSTOLIC BLOOD PRESSURE: 150 MMHG | HEART RATE: 74 BPM

## 2018-02-27 PROCEDURE — 96376 TX/PRO/DX INJ SAME DRUG ADON: CPT

## 2018-02-27 PROCEDURE — 25000125 ZZHC RX 250: Performed by: PHYSICIAN ASSISTANT

## 2018-02-27 PROCEDURE — 40000893 ZZH STATISTIC PT IP EVAL DEFER

## 2018-02-27 PROCEDURE — 25000132 ZZH RX MED GY IP 250 OP 250 PS 637: Performed by: PHYSICIAN ASSISTANT

## 2018-02-27 PROCEDURE — 25000128 H RX IP 250 OP 636: Performed by: PHYSICIAN ASSISTANT

## 2018-02-27 PROCEDURE — 99217 ZZC OBSERVATION CARE DISCHARGE: CPT | Performed by: PHYSICIAN ASSISTANT

## 2018-02-27 PROCEDURE — G0378 HOSPITAL OBSERVATION PER HR: HCPCS

## 2018-02-27 RX ORDER — PREDNISONE 20 MG/1
60 TABLET ORAL DAILY
Status: DISCONTINUED | OUTPATIENT
Start: 2018-02-27 | End: 2018-02-27 | Stop reason: HOSPADM

## 2018-02-27 RX ORDER — RIZATRIPTAN BENZOATE 10 MG/1
10 TABLET ORAL
Qty: 10 TABLET | Refills: 1 | Status: SHIPPED | OUTPATIENT
Start: 2018-02-27 | End: 2019-04-17

## 2018-02-27 RX ORDER — OXYCODONE HYDROCHLORIDE 5 MG/1
5 TABLET ORAL
Qty: 10 TABLET | Refills: 0 | Status: SHIPPED | OUTPATIENT
Start: 2018-02-27 | End: 2019-04-17

## 2018-02-27 RX ORDER — PREDNISONE 20 MG/1
60 TABLET ORAL DAILY
Qty: 9 TABLET | Refills: 0 | Status: SHIPPED | OUTPATIENT
Start: 2018-02-28 | End: 2019-04-17

## 2018-02-27 RX ORDER — TIZANIDINE 2 MG/1
2 TABLET ORAL EVERY 6 HOURS PRN
Qty: 20 TABLET | Refills: 0 | Status: SHIPPED | OUTPATIENT
Start: 2018-02-27 | End: 2019-04-17

## 2018-02-27 RX ADMIN — LEVOTHYROXINE SODIUM 88 MCG: 88 TABLET ORAL at 07:44

## 2018-02-27 RX ADMIN — ACETAMINOPHEN 650 MG: 325 TABLET, FILM COATED ORAL at 03:53

## 2018-02-27 RX ADMIN — OXYCODONE HYDROCHLORIDE 5 MG: 5 TABLET ORAL at 03:53

## 2018-02-27 RX ADMIN — PREDNISONE 60 MG: 20 TABLET ORAL at 10:08

## 2018-02-27 RX ADMIN — ACETAMINOPHEN 650 MG: 325 TABLET, FILM COATED ORAL at 09:18

## 2018-02-27 RX ADMIN — OXYCODONE HYDROCHLORIDE 5 MG: 5 TABLET ORAL at 07:44

## 2018-02-27 RX ADMIN — OXYCODONE HYDROCHLORIDE 5 MG: 5 TABLET ORAL at 09:18

## 2018-02-27 RX ADMIN — ACETAMINOPHEN 650 MG: 325 TABLET, FILM COATED ORAL at 16:29

## 2018-02-27 RX ADMIN — KETOROLAC TROMETHAMINE 30 MG: 30 INJECTION, SOLUTION INTRAMUSCULAR at 05:09

## 2018-02-27 ASSESSMENT — PAIN DESCRIPTION - DESCRIPTORS: DESCRIPTORS: ACHING;DULL

## 2018-02-27 NOTE — CONSULTS
[facility]    Neurology Consultation     Date of Admission:  2/26/2018  Date of Consult (When I saw the patient): 02/26/18    Assessment & Plan   Barbara Smiley is a 52 year old female who was admitted on 2/26/2018. I was asked to see the patient for headache.  She reports ongoing chronic daily headache for the past several days.  No history of headache previously.  She has tried Imitrex and OTC medications including Excedrin migraine without improvement.  She was seen previously in the hospital where MRI and CTA were unremarkable.  She also had an LP which was unremarkable.  She notes some very mild nausea and photophobia/phonophobia with the headache.   She has been tried on several medications including Gabapentin, Compazine, Zyprexa, Reglan, Imitrex, Excedrin migraine without improvement.    Chronic daily headache, tension versus migraine, refractory to medication.  Recommendations  MRV to look for venous clot causing new onset daily headache - done, negative for clot  Toradol, Tizanidine, Maxalt, Compazine therapy, with plan for discharge on Amitriptyline and tapering steroid dose    I discussed the above diagnosis, assessment, and further testing with the patient.  Total time: 45 Minutes, with more than 50% of the time counseling the patient or coordination of care.       Onel Gambino MD    Code Status    Full Code        Reason for Consult   Reason for consult: I was asked by Kay Moreno to evaluate this patient for headache.      Chief Complaint   Headache    History is obtained from the patient and the electronic medical chart.    History of Present Illness   Barbara Smiley is a 52 year old woman who presents for evaluation for She has a history of hypothyroidism due to Hashimoto's thyroiditis.  She presents for daily frontal headaches for almost 2 weeks that are throbbing in nature. Each day the headaches are lasting longer than the day before despite taking  Excedrin migraine, Ibuprofen, Imitex and tylenol. She was admitted on 2/22 for this same headache with negative imaging of her head including CT, CTA and MRI. She was seen by Dr. Whaley with neurology who requested a lumbar puncture that was performed and negative for meningitis.  Her working theory was that her headaches were cervicogenic and she was started on gabapentin and was instructed to follow up with PT. Since discharge on 2/23 she has continued to have daily headaches lasting longer each day.    In ED she received Reglan, Zyprexa and Compazine with minimal improvement so she was admitted to observation.        Past Medical History   I have reviewed this patient's medical history and updated it with pertinent information if needed.   Past Medical History:   Diagnosis Date     Chronic lymphocytic thyroiditis     Hashimoto thyroiditis     Esophageal reflux      Urinary tract infection, site not specified     in the past       Past Surgical History   I have reviewed this patient's surgical history and updated it with pertinent information if needed.  Past Surgical History:   Procedure Laterality Date     ENT SURGERY  toddler    Tosillectomy     GYN SURGERY  2007    novasure     HYSTEROSCOPY  2008    removal of ovarian cyst and endometriosis        Prior to Admission Medications   Prior to Admission Medications   Prescriptions Last Dose Informant Patient Reported? Taking?   IBUPROFEN PO   Yes Yes   Sig: Take 200 mg by mouth every 6 hours as needed for moderate pain   LEVOTHYROXINE SODIUM PO 2/25/2018 at Unknown time  Yes Yes   Sig: Take 88 mcg by mouth daily   Multiple Vitamins-Minerals (MULTIVITAMIN ADULT) CHEW 2/25/2018 at Unknown time  Yes Yes   Sig: Take 2 chew tab by mouth daily   Omega-3 Fatty Acids (FISH OIL ADULT GUMMIES PO) 2/25/2018 at Unknown time  Yes Yes   Sig: Take 2 chew tab by mouth every morning   SUMAtriptan Succinate (IMITREX PO)   Yes Yes   Sig: Take 50 mg by mouth every 8 hours as needed for  migraine   aspirin-acetaminophen-caffeine (EXCEDRIN MIGRAINE) 250-250-65 MG per tablet 2/25/2018 at Unknown time  Yes Yes   Sig: Take 1 tablet by mouth every 6 hours as needed for headaches   atorvastatin (LIPITOR) 10 MG tablet 2/25/2018 at Unknown time  No Yes   Sig: Take 1 tablet (10 mg) by mouth daily   calcium-vitamin D-vitamin K (VIACTIV) 500-500-40 MG-UNT-MCG CHEW 2/25/2018 at Unknown time  Yes Yes   Sig: Take 2 tablets by mouth daily In the Afternoon   gabapentin (NEURONTIN) 300 MG capsule 2/25/2018 at Unknown time  No Yes   Sig: Take 1 capsule (300 mg) by mouth At Bedtime      Facility-Administered Medications: None     Allergies   Allergies   Allergen Reactions     Sulfa Drugs        Social History   I have reviewed this patient's social history and updated it with pertinent information if needed. Barbara Smiley  reports that she has never smoked. She has never used smokeless tobacco. She reports that she drinks alcohol. She reports that she does not use illicit drugs.    Family History   I have reviewed this patient's family history and updated it with pertinent information if needed.   Family History   Problem Relation Age of Onset     C.A.D. Mother      balloon surgery     DIABETES Mother      Cardiovascular Mother      atrial fibrillation      Thyroid Disease Mother      CANCER Mother      Esphogeal Cancer     C.A.D. Father      bypass      DIABETES Father      C.A.D. Paternal Grandmother      DIABETES Paternal Grandmother      C.A.D. Paternal Grandfather      DIABETES Paternal Grandfather      Breast Cancer No family hx of      Cancer - colorectal No family hx of      Prostate Cancer No family hx of        Review of Systems   The 10 point Review of Systems is negative other than noted in the HPI or here.     Physical Exam   Temp: 99.1  F (37.3  C) Temp src: Axillary BP: 140/62 Pulse: 60 Heart Rate: 64 Resp: 16 SpO2: 98 % O2 Device: None (Room air) Oxygen Delivery: 2 LPM  Vital Signs with  Ranges  Temp:  [98  F (36.7  C)-99.1  F (37.3  C)] 99.1  F (37.3  C)  Pulse:  [56-60] 60  Heart Rate:  [64-74] 64  Resp:  [16-20] 16  BP: (121-168)/(48-98) 140/62  SpO2:  [94 %-100 %] 98 %  0 lbs 0 oz    General Appearance:  No acute distress  Neuro:       Mental Status Exam:    Awake, alert, uncomfortable       Cranial Nerves:   CN II-XII intact           Motor:  Moves all extremities          Sensory:  Grossly intact       Coordination:   Grossly intact         Neck: no nuchal rigidity.  CV: RRR      Data   Results for orders placed or performed during the hospital encounter of 02/26/18 (from the past 24 hour(s))   CBC with platelets differential   Result Value Ref Range    WBC Canceled, Test credited 4.0 - 11.0 10e9/L    RBC Count Canceled, Test credited 3.8 - 5.2 10e12/L    Hemoglobin Canceled, Test credited 11.7 - 15.7 g/dL    Hematocrit Canceled, Test credited 35.0 - 47.0 %    MCV Canceled, Test credited 78 - 100 fl    MCH Canceled, Test credited 26.5 - 33.0 pg    MCHC Canceled, Test credited 31.5 - 36.5 g/dL    RDW Canceled, Test credited 10.0 - 15.0 %    Platelet Count Canceled, Test credited 150 - 450 10e9/L    Diff Method Canceled, Test credited    Basic metabolic panel   Result Value Ref Range    Sodium 140 133 - 144 mmol/L    Potassium 3.8 3.4 - 5.3 mmol/L    Chloride 108 94 - 109 mmol/L    Carbon Dioxide 24 20 - 32 mmol/L    Anion Gap 8 3 - 14 mmol/L    Glucose 121 (H) 70 - 99 mg/dL    Urea Nitrogen 19 7 - 30 mg/dL    Creatinine 0.79 0.52 - 1.04 mg/dL    GFR Estimate 76 >60 mL/min/1.7m2    GFR Estimate If Black >90 >60 mL/min/1.7m2    Calcium 9.2 8.5 - 10.1 mg/dL   CBC with platelets differential   Result Value Ref Range    WBC 10.4 4.0 - 11.0 10e9/L    RBC Count 4.93 3.8 - 5.2 10e12/L    Hemoglobin 14.4 11.7 - 15.7 g/dL    Hematocrit 41.4 35.0 - 47.0 %    MCV 84 78 - 100 fl    MCH 29.2 26.5 - 33.0 pg    MCHC 34.8 31.5 - 36.5 g/dL    RDW 12.8 10.0 - 15.0 %    Platelet Count 316 150 - 450 10e9/L    Diff  Method Automated Method     % Neutrophils 63.0 %    % Lymphocytes 27.4 %    % Monocytes 6.6 %    % Eosinophils 1.7 %    % Basophils 0.4 %    % Immature Granulocytes 0.9 %    Nucleated RBCs 0 0 /100    Absolute Neutrophil 6.6 1.6 - 8.3 10e9/L    Absolute Lymphocytes 2.9 0.8 - 5.3 10e9/L    Absolute Monocytes 0.7 0.0 - 1.3 10e9/L    Absolute Eosinophils 0.2 0.0 - 0.7 10e9/L    Absolute Basophils 0.0 0.0 - 0.2 10e9/L    Abs Immature Granulocytes 0.1 0 - 0.4 10e9/L    Absolute Nucleated RBC 0.0    Erythrocyte sedimentation rate auto   Result Value Ref Range    Sed Rate 7 0 - 30 mm/h   TSH with free T4 reflex   Result Value Ref Range    TSH 5.36 (H) 0.40 - 4.00 mU/L   T4 free   Result Value Ref Range    T4 Free 0.84 0.76 - 1.46 ng/dL   MRA Brain Venogram w&wo Contrast    Narrative    MRA BRAIN VENOGRAM WITH AND WITHOUT CONTRAST  2/26/2018  5:42 PM     HISTORY: Headaches. Rule out clot.    TECHNIQUE: MR venography was performed through the dural venous  sinuses without and with contrast. 10 mL of Gadavist given.    COMPARISON: Brain MR dated 2/23/2018.    FINDINGS: The superior sagittal sinus, internal cerebral veins, vein  of Thom, straight sinus, and transverse and sigmoid sinuses are  patent. There is no evidence for any dural venous thrombosis.      Impression    IMPRESSION: Negative MR venography of the dural venous sinuses.    LAZARO LAU MD           Imaging and procedures:  I personally reviewed these images.

## 2018-02-27 NOTE — PLAN OF CARE
Problem: Patient Care Overview  Goal: Plan of Care/Patient Progress Review  Outcome: Improving  PRIMARY DIAGNOSIS: ACUTE PAIN  OUTPATIENT/OBSERVATION GOALS TO BE MET BEFORE DISCHARGE:  1. Pain Status: Improved-controlled with oral pain medications.    2. Return to near baseline physical activity: Yes    3. Cleared for discharge by consultants (if involved): Yes    Discharge Planner Nurse   Safe discharge environment identified: Yes  Barriers to discharge: No       Entered by: Bala Concepcion 02/27/2018 3:14 PM      RN - headache remains well controlled with PO medications. Pt receiving discharge orders. Awaiting delivery of prescriptions and arrival of  for education.    Please review provider order for any additional goals.   Nurse to notify provider when observation goals have been met and patient is ready for discharge.

## 2018-02-27 NOTE — PLAN OF CARE
Problem: Patient Care Overview  Goal: Plan of Care/Patient Progress Review  Outcome: Improving  PRIMARY DIAGNOSIS: ACUTE PAIN  OUTPATIENT/OBSERVATION GOALS TO BE MET BEFORE DISCHARGE:  1. Pain Status: Improved-controlled with oral pain medications - holding IV interventions at this time    2. Return to near baseline physical activity: Yes    3. Cleared for discharge by consultants (if involved): Yes    Discharge Planner Nurse   Safe discharge environment identified: Yes  Barriers to discharge: Yes       Entered by: Bala Concepcion 02/27/2018 10:52 AM      RN - VSS. Pt headache controlled with PO medications. Pt fearful of headache returning. Starting prednisone and refraining from IV intervention at this time.    Please review provider order for any additional goals.   Nurse to notify provider when observation goals have been met and patient is ready for discharge.

## 2018-02-27 NOTE — PLAN OF CARE
Problem: Patient Care Overview  Goal: Plan of Care/Patient Progress Review  Outcome: No Change  PRIMARY DIAGNOSIS: MIGRAINE  OUTPATIENT/OBSERVATION GOALS TO BE MET BEFORE DISCHARGE:  1. Pain Status: Improved-controlled with oral pain medications.    2. Return to near baseline physical activity: Yes    3. Cleared for discharge by consultants (if involved): No    Discharge Planner Nurse   Safe discharge environment identified: Yes  Barriers to discharge: No once cleared       Entered by: Marlene Rodriguez 02/27/2018 5:40 AM     Pt A&O, VSS. 1/10 pain controlled by scheduled toradol and 5mg oxy x1. Independent in room. Saline locked. PT following. Regular diet tolerating well. Continue to monitor pain.      Please review provider order for any additional goals.   Nurse to notify provider when observation goals have been met and patient is ready for discharge.

## 2018-02-27 NOTE — PLAN OF CARE
Problem: Patient Care Overview  Goal: Discharge Needs Assessment  Outcome: Adequate for Discharge Date Met: 02/27/18  Patient's After Visit Summary was reviewed with patient and/or spouse.   Patient verbalized understanding of After Visit Summary, recommended follow up and was given an opportunity to ask questions.   Discharge medications sent home with patient/family: YES, maxalt, tizanidine, amitryptiline, oxycodone, prednisone   Discharged with significant other    OBSERVATION patient END time: [id number]

## 2018-02-27 NOTE — TELEPHONE ENCOUNTER
"Requested Prescriptions   Pending Prescriptions Disp Refills     levothyroxine (SYNTHROID/LEVOTHROID) 88 MCG tablet [Pharmacy Med Name: LEVOTHYROXINE 88 MCG TABLET]  This RX has been discontinued in patient chart  Last Written Prescription Date:  2017  Last Fill Quantity: 90 tablet,  # refills: 1   Last office visit: 2017 with prescribing provider:  James    90 tablet 1     Si TAB SIX DAYS/WEEK, 0.5 TAB ONE DAY/WEEK. TOTAL WEEKLY DOSE 6.5 TABS/WEEK.    Thyroid Protocol Failed    2018 10:05 AM       Failed - Normal TSH on file in past 12 months    Recent Labs   Lab Test  18   1702   TSH  5.36*          Failed - No positive pregnancy test in past 12 months    If patient is pregnant or has had a positive pregnancy test, please check TSH.         Passed - Patient is 12 years or older       Passed - Recent or future visit with authorizing provider's specialty    Patient had office visit in the last year or has a visit in the next 30 days with authorizing provider.  See \"Patient Info\" tab in inbasket, or \"Choose Columns\" in Meds & Orders section of the refill encounter.            Passed - No active pregnancy on record    If patient is pregnant or has had a positive pregnancy test, please check TSH.            "

## 2018-02-27 NOTE — PLAN OF CARE
Problem: Patient Care Overview  Goal: Plan of Care/Patient Progress Review  Outcome: Improving  PRIMARY DIAGNOSIS: Headaches   OUTPATIENT/OBSERVATION GOALS TO BE MET BEFORE DISCHARGE:  1. ADLs back to baseline: Yes, patient able to ambulate to bathroom without head throbbing.     2. Activity and level of assistance: Ambulating independently.    3. Pain status: Improved-controlled with oral pain medications, patient has gotten Flexeril, Amitriptyline,  Tylenol, Toradol, Dilaudid, Maxalt. Plan is to DC patient on tapered Steroid dose and preventative measures.     4. Return to near baseline physical activity: Yes     Discharge Planner Nurse   Safe discharge environment identified: Yes  Barriers to discharge: No       Entered by: Della Lopez 02/26/2018 9:24 PM     Please review provider order for any additional goals.   Nurse to notify provider when observation goals have been met and patient is ready for discharge.    Patient had severe headache at beginning of shift, unable to open eyes or talk without head throbbing. After many PRN and scheduled medications, patient is resting comfortably in bed. Patient is smiling, laughing and very appreciative. MRI venogram was negative for clot. Ice pack behind neck for headache relief has worked very well for patient. Will continue to monitor.

## 2018-02-27 NOTE — PLAN OF CARE
Problem: Patient Care Overview  Goal: Plan of Care/Patient Progress Review  Outcome: No Change  Problem: Patient Care Overview  Goal: Plan of Care/Patient Progress Review  Outcome: No Change  PRIMARY DIAGNOSIS: MIGRAINE  OUTPATIENT/OBSERVATION GOALS TO BE MET BEFORE DISCHARGE:  1. Pain Status: Improved-controlled with oral pain medications.    2. Return to near baseline physical activity: Yes    3. Cleared for discharge by consultants (if involved): No    Discharge Planner Nurse   Safe discharge environment identified: Yes  Barriers to discharge: No once cleared       Entered by: Marlene Rodriguez 02/27/2018 7:03 AM     Pt A&O, VSS. 1/10 pain controlled by scheduled toradol and 5mg oxy x1. Independent in room. Saline locked. PT following. Regular diet tolerating well. Continue to monitor pain. Lumbar puncture and MRI negative. Continue to manage pain, possibly d/c today.      Please review provider order for any additional goals.   Nurse to notify provider when observation goals have been met and patient is ready for discharge.

## 2018-02-27 NOTE — PLAN OF CARE
Problem: Patient Care Overview  Goal: Plan of Care/Patient Progress Review  Discharge Planner PT   Patient plan for discharge: home  Current status: Patient independent with mobility  Barriers to return to prior living situation: none  Recommendations for discharge: home with OP PT for stretching/posture education to reduce headache frequency  Rationale for recommendations: Patient at baseline for mobility, no acute care physical therapy needs.  Will complete orders at this time.       Entered by: Rissa Zamora 02/27/2018 10:24 AM

## 2018-02-27 NOTE — DISCHARGE SUMMARY
[facility] Outpatient / Observation Unit  Discharge Summary        Barbara Smiley MRN# [id number]   YOB: 1965 Age: 52 year old     Date of Admission:  2/26/2018  Date of Discharge:  2/27/2018  Admitting Physician:  Marshall Beltran MD  Discharge Physician: Rhoda Newsome PA-C  Discharging Service: Hospitalist      Primary Provider: Ijeoma Finn  Primary Care Physician Phone Number: [phone]         Primary Discharge Diagnoses:    Barbara Smiley was admitted on 2/26/2018 with complaints of intractable headache.     1. Intractable headache - likely tension like headache. Negative work up thus far including negative CT head, CTA head and neck, MRI, MRV and LP. Neurology consulted. Pt to discharge home on amitriptyline, maxalt, tizanidine, Prednisone taper and Excedrin migraine. Follow up with neurology as needed  2. Hx of Hashimoto's thyroiditis - on 88 mcg levothyroxine. TSH here is a little bit high at 5.36 and T4 was low normal at 0.84. Recommend follow up with Endocrinologist for further management and possible titration of levothyroxine.           Secondary Discharge Diagnoses:     Past Medical History:   Diagnosis Date     Chronic lymphocytic thyroiditis     Hashimoto thyroiditis     Esophageal reflux      Urinary tract infection, site not specified     in the past                Code Status:      Full Code        Brief Hospital Summary:        Reason for your hospital stay       Intractable headache. Work up has been negative including CT head, CT   angio head and neck, MRI brain, MR venogram of the brain and lumbar   puncture. Headache improved with a combination of medications. Recommend   follow up with PT.                    Please refer to initial admission history and physical for further details.   Briefly, Barbara Smiley was admitted on 2/26/2018 for complaints of recurrent intractable headache.     Initial work up in the ED revealed unremarkable labs. She was  seen and admitted 4 days ago and had a negative CT of the head, CTA of the head and neck, MRI of the brain and LP. Neurology was called from ED and recommended MRV of the brain which was negative. EKG did not reveal evidence of significant cardiac arrhythmias or ischemia.  Pt was registered to the Observation Unit for further evaluation.     Pt was placed on multimodal pain control and Neurology was consulted. Amitriptyline was started, gabapentin stopped, recommended switching Imitrex to Malxalt, toradol, tizanidine and compazine.   Laboratory results were reviewed and significant findings addressed.   On the day of discharge, symptoms improved. Neurology also recommended steroid taper on discharge. This was started here and pt will continue Amitriptyline, Maxalt, Tizanidine and Excedrin Migraine. Vitals remained stable and pt was deemed safe for discharge. Medications were reviewed and adjustments made as necessary. Pt is instructed to follow up as below.           Significant Lab During Hospitalization:        Recent Labs  Lab 02/26/18  0913 02/26/18  0829 02/23/18  0524   WBC 10.4 Canceled, Test credited 12.2*   HGB 14.4 Canceled, Test credited 13.2   HCT 41.4 Canceled, Test credited 39.4   MCV 84 Canceled, Test credited 87    Canceled, Test credited 311       Recent Labs  Lab 02/26/18  0829 02/23/18  0524 02/22/18  1517 02/22/18  1512    137  --  137   POTASSIUM 3.8 4.2  --  3.4   CHLORIDE 108 106  --  105   CO2 24 25  --  23   ANIONGAP 8 6  --  9   * 162*  --  114*   BUN 19 14  --  19   CR 0.79 0.64  --  0.74   GFRESTIMATED 76 >90 76 82   GFRESTBLACK >90 >90 >90 >90   ZECHARIAH 9.2 8.8  --  9.3       Recent Labs  Lab 02/26/18  1702 02/23/18  0524 02/22/18  1512   SED 7 7 8   CRP  --  4.4 6.3       Recent Labs  Lab 02/26/18  1702   TSH 5.36*                Significant Imaging During Hospitalization:      Recent Results (from the past 48 hour(s))   MRA Brain Venogram w&wo Contrast    Narrative     MRA BRAIN VENOGRAM WITH AND WITHOUT CONTRAST  2/26/2018  5:42 PM     HISTORY: Headaches. Rule out clot.    TECHNIQUE: MR venography was performed through the dural venous  sinuses without and with contrast. 10 mL of Gadavist given.    COMPARISON: Brain MR dated 2/23/2018.    FINDINGS: The superior sagittal sinus, internal cerebral veins, vein  of Thom, straight sinus, and transverse and sigmoid sinuses are  patent. There is no evidence for any dural venous thrombosis.      Impression    IMPRESSION: Negative MR venography of the dural venous sinuses.    LAZARO LAU MD              Pending Results:        Unresulted Labs Ordered in the Past 30 Days of this Admission     Date and Time Order Name Status Description    2/23/2018 1355 CSF Culture Aerobic Bacterial Tube 2 Preliminary               Consultations This Hospital Stay:      Consultation during this admission received from neurology         Discharge Instructions and Follow-Up:      Follow-up Appointments     Follow-up and recommended labs and tests        Follow up with primary care provider, Ijeoma Finn, within 7 days for   hospital follow- up.  No follow up labs or test are needed.  Consider follow up with neurology if not improved.  Follow up with endocrinology to discuss TSH and T4.   Outpatient physical therapy  Continue amitriptyline every evening.   Maxalt as needed at the onset of headache, may repeat dose once. Use no   more than 3 times per week  Continue Tizanidine as needed for muscle relaxer.   Complete Prednisone taper.                  Pt instructed to follow up with PCP and Neurologist as needed    Follow-up Labs None        Discharge Disposition:      Discharged to home         Discharge Medications:        Current Discharge Medication List      START taking these medications    Details   oxyCODONE IR (ROXICODONE) 5 MG tablet Take 1 tablet (5 mg) by mouth every 3 hours as needed for other (pain control)  Qty: 10 tablet, Refills: 0     Associated Diagnoses: Tension headache      amitriptyline (ELAVIL) 25 MG tablet Take 1 tablet (25 mg) by mouth At Bedtime  Qty: 30 tablet, Refills: 0    Associated Diagnoses: Tension headache      predniSONE (DELTASONE) 20 MG tablet Take 3 tablets (60 mg) by mouth daily For 1 day, 40 mg per day for 2 days and 20 mg per day for 2 days.  Qty: 9 tablet, Refills: 0    Associated Diagnoses: Tension headache      tiZANidine (ZANAFLEX) 2 MG tablet Take 1 tablet (2 mg) by mouth every 6 hours as needed for muscle spasms (tension headache)  Qty: 20 tablet, Refills: 0    Associated Diagnoses: Tension headache      rizatriptan (MAXALT) 10 MG tablet Take 1 tablet (10 mg) by mouth at onset of headache for migraine May repeat in 2 hours. Max 3 tablets/24 hours.  Qty: 10 tablet, Refills: 1    Associated Diagnoses: Tension headache         CONTINUE these medications which have NOT CHANGED    Details   LEVOTHYROXINE SODIUM PO Take 88 mcg by mouth daily      IBUPROFEN PO Take 200 mg by mouth every 6 hours as needed for moderate pain      aspirin-acetaminophen-caffeine (EXCEDRIN MIGRAINE) 250-250-65 MG per tablet Take 1 tablet by mouth every 6 hours as needed for headaches  Qty: 80 tablet      calcium-vitamin D-vitamin K (VIACTIV) 500-500-40 MG-UNT-MCG CHEW Take 2 tablets by mouth daily In the Afternoon      Multiple Vitamins-Minerals (MULTIVITAMIN ADULT) CHEW Take 2 chew tab by mouth daily      Omega-3 Fatty Acids (FISH OIL ADULT GUMMIES PO) Take 2 chew tab by mouth every morning      atorvastatin (LIPITOR) 10 MG tablet Take 1 tablet (10 mg) by mouth daily  Qty: 90 tablet, Refills: 1    Associated Diagnoses: Hyperlipidemia LDL goal <130         STOP taking these medications       SUMAtriptan Succinate (IMITREX PO) Comments:   Reason for Stopping:         gabapentin (NEURONTIN) 300 MG capsule Comments:   Reason for Stopping:                   Allergies:         Allergies   Allergen Reactions     Sulfa Drugs            Condition and  Physical on Discharge:      Discharge condition: Stable   Vitals: Blood pressure 157/77, pulse 74, temperature 96.4  F (35.8  C), temperature source Oral, resp. rate 20, last menstrual period 08/01/2007, SpO2 95 %, not currently breastfeeding.  0 lbs 0 oz      GENERAL:  Comfortable.  PSYCH: pleasant, oriented, No acute distress.  HEART:  RRR  LUNGS:  Normal Respiratory effort.   EXTREMITIES:  Able to ambulate independently.  SKIN:  Dry to touch, No obvious rash, wound or ulcerations.  NEUROLOGIC:  Grossly intact    Rhoda Newsome PA-C

## 2018-02-28 ENCOUNTER — THERAPY VISIT (OUTPATIENT)
Dept: PHYSICAL THERAPY | Facility: CLINIC | Age: 53
End: 2018-02-28
Payer: COMMERCIAL

## 2018-02-28 DIAGNOSIS — G44.209 TENSION HEADACHE: Primary | ICD-10-CM

## 2018-02-28 LAB
BACTERIA SPEC CULT: NO GROWTH
SPECIMEN SOURCE: NORMAL

## 2018-02-28 PROCEDURE — 97161 PT EVAL LOW COMPLEX 20 MIN: CPT | Mod: GP | Performed by: PHYSICAL THERAPIST

## 2018-02-28 PROCEDURE — 97140 MANUAL THERAPY 1/> REGIONS: CPT | Mod: GP | Performed by: PHYSICAL THERAPIST

## 2018-02-28 PROCEDURE — 97110 THERAPEUTIC EXERCISES: CPT | Mod: GP | Performed by: PHYSICAL THERAPIST

## 2018-02-28 RX ORDER — LEVOTHYROXINE SODIUM 100 UG/1
100 TABLET ORAL DAILY
Qty: 90 TABLET | Refills: 0 | Status: SHIPPED | OUTPATIENT
Start: 2018-02-28 | End: 2019-04-17

## 2018-02-28 RX ORDER — LEVOTHYROXINE SODIUM 88 UG/1
88 TABLET ORAL DAILY
Qty: 90 TABLET | Refills: 0 | Status: SHIPPED | OUTPATIENT
Start: 2018-02-28 | End: 2019-04-17

## 2018-02-28 NOTE — TELEPHONE ENCOUNTER
Last visit with Gena 8/17/17.  Had labs 2/26/18 at hospital.  See results.  Please comment on dose and recheck visit.  Thanks, Abril Whitlock RN    8/17/17  A/P  Ms.Bronwyn ISAIAH Smiley is a 52 year old here for the management of:     1. Hypothyroidism secondary to Hashimoto's.  Currently treated with levothyroxine 88 mcg/day.  Will obtain TFT's today and adjust levo dose if indicated.     Differential includes: autoimmune disease (Hashimoto's thyroiditis), treatment for hyperthyroidism, radiation therapy, thyroid surgery, medications, congenital disease, pituitary disorder, pregnancy, and iodine deficiency.  Persons with Hashimoto's thyroiditis have serum antibodies reacting with TG, TPO, and against an unidentified protein present in colloid.      Standard treatment for hypothyroidism involves daily use of the synthetic thyroid hormone levothyroxine (Levothroid, Synthroid, others).  The dosage of thyroxine should normally be that required to bring the serum TSH level to the low normal range, such as 0.3 - 1 uU/ml. This is typically achieved with 1 ug L-T4/lb body weight/day, ranges from 75 - 125 ug/day in women, and 125 - 200 ug/day in men. Once thyroxine treatment is initiated, it is required indefinitely in most patients.      Symptoms should improve one to two weeks after starting treatment. Treatment with levothyroxine is usually lifelong.  Dosage may need to be adjusted based on body weight, medications, or pregnancy.  To determine the right dosage of levothyroxine initially we will repeat TSH and free T4 after two months.     Excessive amounts of the hormone can cause side effects, such as: Increased appetite, insomnia, heart palpitations, and shakiness.  Patients with CAD will be started on a lower dose.  Levothyroxine causes virtually no side effects when used in the appropriate dose.    Certain medications, supplements and foods can affect the body s ability to absorb levothyroxine. Medications  include: Iron supplements, Cholestyramine and Calcium supplements.      Patient was advised that decreased absorption of levothyroxine might occur if taken concomitantly with food or within four hours of taking calcium, iron, soy or aluminum containing antacids. Generic substitution for brand name and vice versa, or substitution of one generic formulation for another may cause abnormal TSH levels on a previously stable dose of thyroid hormone. Pt was advised that regular monitoring of thyroid function, as prescribed by the physician, and adherence to dose prescribed, is important.     2.  History of thyroid nodules - now resolved  Repeat thyroid ultrasound showed no nodules.     3.  IFG., + insulin resistance. Currently treated with Metformin 1000 mg/d.  Will obtain fasting glucose and insulin level today.  Would expect these measures to improve with diet, exercise, and weight loss.  Consider decreasing metformin, depending on lab results.     4.  Hyperlipidemia.  Currently treated with Atorvastatin.  Obtain fasting lipid panel, consider decreasing Atorvastatin dose depending on results.     5.  Obesity.  BMI 29.1.  She has lost a total of 42 lbs with diet and exercise efforts, 232 --> 190 lbs.  Continue diet and exercise efforts.        Labs ordered today:       Orders Placed This Encounter   Procedures     Glucose     Hemoglobin A1c     Insulin level     Lipid panel reflex to direct LDL     TSH     T4 FREE     Lipid panel reflex to direct LDL     Glucose     Insulin level     Hemoglobin A1c     TSH     T4 FREE         More than 50% of the time spent with Ms. Smiley on counseling / coordinating her care.  Total face to face time was greater than or equal to 25 minutes.        Follow-up:  follow up 12 weeks     Gena Martinez NP  Endocrinology  [facility]  CC: Other Clinic, Md

## 2018-02-28 NOTE — TELEPHONE ENCOUNTER
Her recent TSH was elevated.  I would like her to increase levo to 88 mcg every day.  I previously had her taking 1/2 tablet one day each week and 1 tab six days each week.  I would like her to schedule a follow up appointment with me in 6-8 weeks for a recheck.  Gena Martinez NP  Endocrinology

## 2018-02-28 NOTE — TELEPHONE ENCOUNTER
Called patient and she states has been taking 7 days a week already.  States only misses one dose about every 3 months.  Taking on empty stomach same time every day.  Per Gena go to Levothyroxine 100 mcg daily.  New RX sent with note to cancel RX for 88 mcg and to see Gena in 6-8 weeks.  Declined to schedule now as heading out to doctor appt.  Abril Whitlock RN

## 2018-02-28 NOTE — MR AVS SNAPSHOT
After Visit Summary   2/28/2018    Barbara Smiley    MRN: [id number]           Patient Information     Date Of Birth          1965        Visit Information        Provider Department      2/28/2018 10:40 AM Evelia Thornton PT [facility] Adam HERNADEZ        Today's Diagnoses     Tension headache    -  1       Follow-ups after your visit        Who to contact     If you have questions or need follow up information about today's clinic visit or your schedule please contact [facility] ADAM HERNADEZ directly at [phone].  Normal or non-critical lab and imaging results will be communicated to you by [employer], letter or phone within 4 business days after the clinic has received the results. If you do not hear from us within 7 days, please contact the clinic through [employer] or phone. If you have a critical or abnormal lab result, we will notify you by phone as soon as possible.  Submit refill requests through [employer] or call your pharmacy and they will forward the refill request to us. Please allow 3 business days for your refill to be completed.          Additional Information About Your Visit        MyChart Information     [employer] gives you secure access to your electronic health record. If you see a primary care provider, you can also send messages to your care team and make appointments. If you have questions, please call your primary care clinic.  If you do not have a primary care provider, please call [phone] and they will assist you.        Care EveryWhere ID     This is your Care EveryWhere ID. This could be used by other organizations to access your [facility] medical records  [id number]        Your Vitals Were     Last Period                   08/01/2007            Blood Pressure from Last 3 Encounters:   02/27/18 150/64   02/23/18 135/47   08/17/17 114/70    Weight from Last 3 Encounters:   02/22/18 101.6 kg (224 lb)   08/17/17 86.2 kg (190  lb)   05/22/17 100.2 kg (221 lb)              We Performed the Following     HC PT EVAL, LOW COMPLEXITY     MANUAL THER TECH,1+REGIONS,EA 15 MIN     THERAPEUTIC EXERCISES        Primary Care Provider Office Phone # Fax #    Ijeoma Finn [phone]       [facility] [address] [facility] [address]        Equal Access to Services     [facility]: Hadii aad ku hadasho Soomaali, waaxda luqadaha, qaybta kaalmada adeegyada, waxay idiin hayaan adeeg kharash la'aan ah. So [facility] [phone].    ATENCIÓN: Si habla vitor, tiene a barnett disposición servicios gratuitos de asistencia lingüística. Mendel al [phone].    We comply with applicable federal civil rights laws and [place] laws. We do not discriminate on the basis of race, color, national origin, age, disability, sex, sexual orientation, or gender identity.            Thank you!     Thank you for choosing [facility]  for your care. Our goal is always to provide you with excellent care. Hearing back from our patients is one way we can continue to improve our services. Please take a few minutes to complete the written survey that you may receive in the mail after your visit with us. Thank you!             Your Updated Medication List - Protect others around you: Learn how to safely use, store and throw away your medicines at www.disposemymeds.org.          This list is accurate as of 2/28/18 12:10 PM.  Always use your most recent med list.                   Brand Name Dispense Instructions for use Diagnosis    amitriptyline 25 MG tablet    ELAVIL    30 tablet    Take 1 tablet (25 mg) by mouth At Bedtime    Tension headache       aspirin-acetaminophen-caffeine 250-250-65 MG per tablet    EXCEDRIN MIGRAINE    80 tablet    Take 1 tablet by mouth every 6 hours as needed for headaches        atorvastatin 10 MG tablet    LIPITOR    90 tablet    Take 1 tablet (10 mg) by mouth daily    Hyperlipidemia LDL  goal <130       calcium-vitamin D-vitamin K 500-500-40 MG-UNT-MCG Chew    VIACTIV     Take 2 tablets by mouth daily In the Afternoon        FISH OIL ADULT GUMMIES PO      Take 2 chew tab by mouth every morning        IBUPROFEN PO      Take 200 mg by mouth every 6 hours as needed for moderate pain        LEVOTHYROXINE SODIUM PO      Take 88 mcg by mouth daily        MULTIVITAMIN ADULT Chew      Take 2 chew tab by mouth daily        oxyCODONE IR 5 MG tablet    ROXICODONE    10 tablet    Take 1 tablet (5 mg) by mouth every 3 hours as needed for other (pain control)    Tension headache       predniSONE 20 MG tablet    DELTASONE    9 tablet    Take 3 tablets (60 mg) by mouth daily For 1 day, 40 mg per day for 2 days and 20 mg per day for 2 days.    Tension headache       rizatriptan 10 MG tablet    MAXALT    10 tablet    Take 1 tablet (10 mg) by mouth at onset of headache for migraine May repeat in 2 hours. Max 3 tablets/24 hours.    Tension headache       tiZANidine 2 MG tablet    ZANAFLEX    20 tablet    Take 1 tablet (2 mg) by mouth every 6 hours as needed for muscle spasms (tension headache)    Tension headache

## 2018-02-28 NOTE — PROGRESS NOTES
"[facility] Initial Evaluation  Subjective:  HPI                    Objective:  System    Physical Exam    General     [facility] Initial Evaluation -- Cervical    Evaluation Date: February 28, 2018  Barbara Smiley is a 52 year old female with a head condition.   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  Paresthesia (yes/no):  No    Present since (onset date): 2/22/18     Symptoms (improving/unchanging/worsening):  Improving.    Symptoms commenced as a result of: UNk - although Pt reports episode of LBP where she could not bend over preceded this   Condition occurred in the following environment:  UNK    Symptoms at onset (neck/arm/forearm/headache): neck  Constant symptoms (neck/arm/forearm/headache): HA  Intermittent symptoms (neck/arm/forearm/headache): neck     Symptoms are made worse with the following: Sometimes Sitting, Sometimes Turning, Sometimes Lying, Sometimes Rising, time of day - No effect, Sometimes When still and Sometimes On the move   Symptoms are made better with the following: Sometimes When still and Sometimes On the move, using computer    Disturbed sleep (yes/no): No  Number of pillows: One  Sleeping postures (prone/sup/side R/L): sides and back    Previous episodes (0/1-5/6-10/11+): 0 Year of first episode:     Previous history: None  Previous treatments:  chiro has been helpful in the past, Seen in obs unit at [facility] 2/22-23 and again 2/26-27    Specific Questions: (as reported by the " "patient)  Dizziness/Tinnitus/Nausea/Swallowing (pos/neg): nausea and dizziness. Baseline tinnitus  Gait/Upper Limbs (normal/abnormal): normal  Medications (nil/NSAIDS/anlag/steroids/anticoag/other): HA/migraine meds  Medical allergies:  sulfa  General health (excellent/good/fair/poor):  good  Pertinent medical history: thyroid problems (caused hearing loss and tinnitus), overweight,anxiety  Imaging (None/Xray/MRI/Other):  LOTS- MRI, CT scan, spinal cord puncture  Recent or major surgery (yes/no): No  Night pain (yes/no): No  Accidents (yes/no): No  Unexplained weight loss (yes/no): No  Barriers at home: None known  Other red flags: None known    EXAMINATION    Posture:   Sitting (good/fair/poor): Poor  Standing (good/fair/poor): Poor     Protruded head (yes/no): Yes    Wry Neck (right/left/nil):  Nil  Relevant (yes/no):       Correction of posture(better/worse/no effect): NE  Other observations:  Pt has \"HA\" look with dimmed eyelids, etc- also could be medicine inducted. Some difficulty with concentration    Neurological:    Motor Deficit:  NT   Reflexes:  NT  Sensory Deficit:  NT   Dural signs:  NT    Movement Loss:   Mario Mod Min Nil Pain   Protrusion   x  Does not effect HA   Flexion   x  Does not effect HA   Retraction x    Does not effect HA   Extension  x   Does not effect HA   Lateral flexion R  x   Does not effect HA   Lateral flexion L   x  Does not effect HA   Rotation R  x   Does not effect CARMICHAEL   Rotation L   x  Does not effect HA     Test Movements:   During: produces, abolishes, increases, decreases, no effect, centralizing, peripheralizing  After: better, worse, no better, no worse, no effect, centralized, peripheralized    Pretest symptoms sitting: frontal HA   Symptoms During Symptoms After ROM increased ROM decreased No Effect   PRO No Effect    No Effect         Rep PRO No Effect    No Effect         RET No Effect    No Effect         Rep RET No Effect    No Effect         RET EXT No Effect    No " Effect         Rep RET EXT No Effect    No Effect         Pretest symptoms lying:  Frontal HA   Symptoms During Symptoms After ROM increased ROM decreased No Effect   RET No Effect    No Effect         Rep RET No Effect    No Effect         RET EXT        Rep RET EXT          Static Tests:   Protrusion:    Flexion:    Retraction:  Supine 30-60 seconds without effect  Extension (sitting/prone/supine):      Other Tests:     Provisional Classification:  Inconclusive/Other - Mechanically Inconclusive    Principle of Management:  Education:  3 Phases of healin    Equipment provided:  Lumbar roll  Mechanical therapy (Y/N):  Y   Extension principle:  repRetr   Lateral principle:    Flexion principle:     Other:  Pt may benefit from manipulation/massage    ASSESSMENT/PLAN:    Patient is a 52 year old female with cervical and thoracic complaints.    Patient has the following significant findings with corresponding treatment plan.                Diagnosis 1:  Intractable HA  Pain -  manual therapy, self management, education and home program  Decreased ROM/flexibility - manual therapy, therapeutic exercise, therapeutic activity and home program  Decreased function - therapeutic activities and home program    Therapy Evaluation Codes:   1) History comprised of:   Personal factors that impact the plan of care:      None.    Comorbidity factors that impact the plan of care are:      Overweight.     Medications impacting care: Anti-inflammatory and Pain.  2) Examination of Body Systems comprised of:   Body structures and functions that impact the plan of care:      Cervical spine.   Activity limitations that impact the plan of care are:      Bending, Driving, Reading/Computer work and Sitting.  3) Clinical presentation characteristics are:   Stable/Uncomplicated.  4) Decision-Making    Low complexity using standardized patient assessment instrument and/or measureable assessment of functional outcome.  Cumulative Therapy  Evaluation is: Low complexity.    Previous and current functional limitations:  (See Goal Flow Sheet for this information)    Short term and Long term goals: (See Goal Flow Sheet for this information)     Communication ability:  Patient appears to be able to clearly communicate and understand verbal and written communication and follow directions correctly.  Treatment Explanation - The following has been discussed with the patient:   RX ordered/plan of care  Anticipated outcomes  Possible risks and side effects  This patient would benefit from PT intervention to resume normal activities.   Rehab potential is questionable.    Frequency:  2 X a month, once daily  Duration:  for 2 months  Discharge Plan:  Independent in home treatment program.  Reach maximal therapeutic benefit.    Please refer to the daily flowsheet for treatment today, total treatment time and time spent performing 1:1 timed codes.

## 2018-05-23 ENCOUNTER — HOSPITAL ENCOUNTER (OUTPATIENT)
Dept: MAMMOGRAPHY | Facility: CLINIC | Age: 53
Discharge: HOME | End: 2018-05-23
Attending: FAMILY MEDICINE | Admitting: FAMILY MEDICINE
Payer: COMMERCIAL

## 2018-05-23 DIAGNOSIS — Z12.31 VISIT FOR SCREENING MAMMOGRAM: ICD-10-CM

## 2018-05-23 PROCEDURE — 77067 SCR MAMMO BI INCL CAD: CPT

## 2019-04-17 ENCOUNTER — OFFICE VISIT (OUTPATIENT)
Dept: ENDOCRINOLOGY | Facility: CLINIC | Age: 54
End: 2019-04-17
Payer: COMMERCIAL

## 2019-04-17 VITALS
HEART RATE: 90 BPM | BODY MASS INDEX: 35.53 KG/M2 | TEMPERATURE: 97.9 F | WEIGHT: 221.1 LBS | HEIGHT: 66 IN | DIASTOLIC BLOOD PRESSURE: 76 MMHG | SYSTOLIC BLOOD PRESSURE: 120 MMHG | OXYGEN SATURATION: 98 %

## 2019-04-17 DIAGNOSIS — G44.209 TENSION HEADACHE: ICD-10-CM

## 2019-04-17 DIAGNOSIS — E03.4 HYPOTHYROIDISM DUE TO ACQUIRED ATROPHY OF THYROID: Primary | ICD-10-CM

## 2019-04-17 DIAGNOSIS — E06.3 HYPOTHYROIDISM DUE TO HASHIMOTO'S THYROIDITIS: ICD-10-CM

## 2019-04-17 LAB — HBA1C MFR BLD: 5.5 % (ref 0–5.6)

## 2019-04-17 PROCEDURE — 84439 ASSAY OF FREE THYROXINE: CPT | Performed by: INTERNAL MEDICINE

## 2019-04-17 PROCEDURE — 99215 OFFICE O/P EST HI 40 MIN: CPT | Performed by: INTERNAL MEDICINE

## 2019-04-17 PROCEDURE — 84481 FREE ASSAY (FT-3): CPT | Performed by: INTERNAL MEDICINE

## 2019-04-17 PROCEDURE — 36415 COLL VENOUS BLD VENIPUNCTURE: CPT | Performed by: INTERNAL MEDICINE

## 2019-04-17 PROCEDURE — 83036 HEMOGLOBIN GLYCOSYLATED A1C: CPT | Performed by: INTERNAL MEDICINE

## 2019-04-17 PROCEDURE — 84443 ASSAY THYROID STIM HORMONE: CPT | Performed by: INTERNAL MEDICINE

## 2019-04-17 PROCEDURE — 83001 ASSAY OF GONADOTROPIN (FSH): CPT | Performed by: INTERNAL MEDICINE

## 2019-04-17 RX ORDER — ESTRADIOL 1 MG/1
TABLET ORAL
COMMUNITY
Start: 2019-03-09 | End: 2019-04-17

## 2019-04-17 RX ORDER — TOPIRAMATE 25 MG/1
TABLET, FILM COATED ORAL
COMMUNITY
Start: 2019-03-19

## 2019-04-17 RX ORDER — LEVOTHYROXINE SODIUM 125 UG/1
125 TABLET ORAL DAILY
COMMUNITY

## 2019-04-17 ASSESSMENT — MIFFLIN-ST. JEOR: SCORE: 1624.65

## 2019-04-17 NOTE — PROGRESS NOTES
Name: Barbara Smiley   ALBIN from [facility].  Also saw  at .  Available records, labs and images from outside clinic were personally reviewed.    F/u for   Chief Complaint   Patient presents with     Establish Care     self referral, hypo due to Hashimoto's and nodules, problems with orgasms, hair thinning, fa h/o thyroid issues,      Thyroid Disease     HPI:  Barbara Smiley is a 54 year old female who presents for the management of hypothyroidism and thyroid nodules.    Thyroid nodule :  she was initially noted to have an enlarged thyroid gland on exam during a routine GYN visit in 2007.   Follow up ultrasound showed bilateral thyroid nodules.  She was referred to Dr. Jose, endocrinologist, for further evaluation.  She states labs done by Dr. Jose showed hypothyroidism and Hashimoto's.  She was started on levothyroxine and a FNA biopsy was done.  She recalls the FNA biopsy being benign.    Her last thyroid ultrasound was done in 2008 - this showed right lobe nodules unchanged, a 1.3 cm solid left lobe nodule that was stable in size, and a newly noted 0.9 cm right isthmus nodule.  Follow-up ultrasound done in 2017 with [facility] did not identify any discrete nodules.    She denies any difficulty swallowing or voice changes.  No history of radiation treatments to the head or neck.  No known family history of thyroid cancer although + FH of hypothyroidism.    Hypothyroidism:  +TPO consistent with Hashimoto disease  Strong family history of hypothyroidism including mother and brother  Currently taking generic levothyroxine 125 mcg/day  On this dose X since 9/2018. Dose was increased at that time from 112 by   Reports compliance.  Feeling OK  Enery levels are OK  She feels well at this time and denies any tachycardia, palpitations, heat intolerance, tremor, insomnia, diarrhea, or unexplained weight loss.  She also denies any symptoms of under replacement-no fatigue, cold  intolerance, constipation, or unexplained weight gain.       Hair loss:  -- it continues  Ongoing X in 50s  Has not tried rogaine or spironolactone  Mother had similar problem in her old age    Decreased oragasm:  -- in last 6 months  S/p menopause- had ablation- had heavy cycles at that time  LMP was 10-12 years back  No dryness  On estrace tab-she was started on 8/20/2018 by   She was told that it can be related to thyroid so she has questions about it    Weight gain:  Body mass index is 35.69 kg/m .  Doing nutrisystem ( limits calories [id number])  Thinks that she lost some weight  Walks and doing some weights  On topamax for migraines  Was seen at [facility]  in the past and was on phentermine but on phentermine she had jittery sensation and stopped using that        PMH/PSH:  Past Medical History:   Diagnosis Date     Chronic lymphocytic thyroiditis     Hashimoto thyroiditis     Esophageal reflux      Urinary tract infection, site not specified     in the past     Past Surgical History:   Procedure Laterality Date     ENT SURGERY  toddler    Tosillectomy     GYN SURGERY  2007    novasure     HYSTEROSCOPY  2008    removal of ovarian cyst and endometriosis      Family Hx:  Family History   Problem Relation Age of Onset     C.A.D. Mother         balloon surgery     Diabetes Mother      Cardiovascular Mother         atrial fibrillation      Thyroid Disease Mother      Cancer Mother         Esphogeal Cancer     C.A.D. Father         bypass      Diabetes Father      C.A.D. Paternal Grandmother      Diabetes Paternal Grandmother      C.A.D. Paternal Grandfather      Diabetes Paternal Grandfather      Breast Cancer No family hx of      Cancer - colorectal No family hx of      Prostate Cancer No family hx of      Thyroid disease:  Yes, mother        DM2: Yes, Mother, father, PGM, PGF        Autoimmune: DM1, SLE, RA, Vitiligo     Social Hx:  Social History     Socioeconomic History     Marital status:      Spouse  "name: Not on file     Number of children: Not on file     Years of education: Not on file     Highest education level: Not on file   Occupational History     Not on file   Social Needs     Financial resource strain: Not on file     Food insecurity:     Worry: Not on file     Inability: Not on file     Transportation needs:     Medical: Not on file     Non-medical: Not on file   Tobacco Use     Smoking status: Never Smoker     Smokeless tobacco: Never Used   Substance and Sexual Activity     Alcohol use: Yes     Comment: a couple drinks per year     Drug use: No     Sexual activity: Yes     Partners: Male     Birth control/protection: Surgical   Lifestyle     Physical activity:     Days per week: Not on file     Minutes per session: Not on file     Stress: Not on file   Relationships     Social connections:     Talks on phone: Not on file     Gets together: Not on file     Attends Restorationist service: Not on file     Active member of club or organization: Not on file     Attends meetings of clubs or organizations: Not on file     Relationship status: Not on file     Intimate partner violence:     Fear of current or ex partner: Not on file     Emotionally abused: Not on file     Physically abused: Not on file     Forced sexual activity: Not on file   Other Topics Concern     Parent/sibling w/ CABG, MI or angioplasty before 65F 55M? No   Social History Narrative     Not on file          MEDICATIONS:  has a current medication list which includes the following prescription(s): amitriptyline, atorvastatin, calcium-vitamin d-vitamin k, cholecalciferol, levothyroxine, multivitamin adult, and topiramate.    ROS   ROS: 10 point ROS neg other than the symptoms noted above in the HPI.    Physical Exam   VS: /76 (BP Location: Left arm, Patient Position: Chair, Cuff Size: Adult Large)   Pulse 90   Temp 97.9  F (36.6  C) (Oral)   Ht 1.676 m (5' 6\")   Wt 100.3 kg (221 lb 1.6 oz)   LMP 08/01/2007   SpO2 98%   " Breastfeeding? No   BMI 35.69 kg/m    GENERAL: AXOX3, NAD, well dressed, answering questions appropriately, appears stated age.  HEENT: no exophthalmos, no proptosis, EOMI, no lig lag, no retraction  NECK:  Supple, thyroid gland slightly enlarged and irregular - more prominent on the right, no distinct nodules palpable, no tenderness with palpation, no adenopathy  CV: RRR, no rubs, gallops, no murmurs  LUNGS: CTAB, no wheezes, rales, or rhonchi  ABDOMEN: soft, nontender, nondistended  EXTREMITIES: no edema, +pulses, no rashes, no lesions  NEUROLOGY: CN grossly intact, no tremors  MSK: grossly intact  SKIN: no rashes, no lesions    LABS:  TFTs:  ENDO THYROID LABS-[facility] Latest Ref Rng & Units 2/26/2018 8/17/2017   TSH 0.40 - 4.00 mU/L 5.36 (H) 0.34 (L)   T4 FREE 0.76 - 1.46 ng/dL 0.84 1.13     Component    Latest Ref Rng & Units 5/13/2017   Free T3    2.3 - 4.2 pg/mL 2.3   Triiodothyronine (T3)    60 - 181 ng/dL 87   TSH    0.40 - 4.00 mU/L 1.65   T4 Free    0.76 - 1.46 ng/dL 0.88   Thyroid Peroxidase Antibody    <35 IU/mL 191 (H)   Thyroglobulin Antibody    <40 IU/mL <20     !COMPREHENSIVE Latest Ref Rng & Units 5/13/2017   SODIUM 133 - 144 mmol/L 143   POTASSIUM 3.4 - 5.3 mmol/L 4.0   CHLORIDE 94 - 109 mmol/L 110 (H)   CO2 mmol/L    BUN 7 - 30 mg/dL 15   Creatinine 0.52 - 1.04 mg/dL 0.74   Glucose 70 - 99 mg/dL 100 (H)   ANION GAP 3 - 14 mmol/L 9   CALCIUM 8.5 - 10.1 mg/dL 9.2     !COMPREHENSIVE Latest Ref Rng & Units 10/26/2010 2/6/2013 2/25/2014   Glucose 70 - 99 mg/dL 110 (H) 85 102 (H)     !COMPREHENSIVE Latest Ref Rng & Units 5/13/2017   Glucose 70 - 99 mg/dL 100 (H)     Component    Latest Ref Rng & Units 5/13/2017   Hemoglobin A1C    4.3 - 6.0 % 5.5   C-Peptide    0.9 - 6.9 ng/mL 2.6   Insulin    3 - 25 mU/L 19.9     !LIPID/HEPATIC Latest Ref Rng & Units 5/13/2017   CHOLESTEROL <200 mg/dL 157   TRIGLYCERIDES <150 mg/dL 108   HDL CHOLESTEROL >49 mg/dL 45 (L)   LDL CHOLESTEROL, CALCULATED <100 mg/dL 90  "  VLDL-CHOLESTEROL 0 - 30 mg/dL    NON HDL CHOLESTEROL <130 mg/dL 112   CHOLESTEROL/HDL RATIO 0.0 - 5.0    AST 0 - 45 U/L 11   ALT 0 - 50 U/L 37     Component    Latest Ref Rng & Units 5/13/2017   WBC    4.0 - 11.0 10e9/L 7.8   RBC Count    3.8 - 5.2 10e12/L 4.56   Hemoglobin    11.7 - 15.7 g/dL 13.4   Hematocrit    35.0 - 47.0 % 40.0   MCV    78 - 100 fl 88   MCH    26.5 - 33.0 pg 29.4   MCHC    31.5 - 36.5 g/dL 33.5   RDW    10.0 - 15.0 % 13.0   Platelet Count    150 - 450 10e9/L 300   Sed Rate    0 - 30 mm/h 9     Component      Latest Ref Rng & Units 5/13/2017   Ferritin      8 - 252 ng/mL 112     Component    Latest Ref Rng & Units 5/13/2017   Vitamin D Deficiency screening    20 - 75 ug/L 48     Component    Latest Ref Rng & Units 5/12/2017   Cortisol Free Duration Urine    h 24 HR   Volume    mL 2950 ML   Cortisol ug/g creatinine   18 years and older: Less than 32 ug/g crt   16.83   Cortisol Free Urine Random     6.90   Cortisol Free Urine     20.4   Creat/Vol     41   Creat/24hr     1210     Vital Signs 2/28/2014 5/8/2017 5/22/2017 8/17/2017   Weight (LB) 232 lb 212 lb 221 lb 190 lb   Height 5' 8\" 5' 7.75\"     BMI (Calculated) 35.35 32.54       ULTRASOUND THYROID 10/25/2007     HISTORY: Thyroid nodule.     COMPARISON: None.     FINDINGS: The right and left lobes of the thyroid gland measure 4.9 x  1.9 x 1.6 cm and 4.8 x 1.9 x 1.5 cm, respectively. Several small  ill-defined hypoechoic nodules are present within both lobes of the  thyroid gland. The largest nodule is in the upper aspect of the left  lobe measuring 1.3 x 1.1 x 0.7 cm. All the other nodules are less than  1 cm in mean diameter. No microcalcifications associated with any of  the nodules.     IMPRESSION:  1. Several small ill-defined nodules present within a normal sized  thyroid gland.  2. The largest nodule is a solid nodule in the upper aspect of the  left lobe measuring 1.3 x 1.1 x 0.7 cm.    EXAM:  US THYROID 7/10/2008     HISTORY: "  Multinodular goiter.  Compare to previous US done 10/25/07.   Stat read and fax results to [phone], pt has 9am appt on  07/11/08.      FINDINGS: The study is compared with the previous of 10/25/2007.     The right lobe measures 4.9 cm in length by 1.7 x 1.9 cm. This is  unchanged. The left lobe measures 4.7 x 1.6 x 1.7 cm and is also  unchanged. No change in the AP dimension of the isthmus at just over 2  mm in diameter.     The nodules within the right lobe are unchanged. However there is a  new solid nodule measuring 9 mm by 4 x 7 within the right isthmus. In  the left upper pole there is a 13 mm solid nodule which is stable.  Stable appearance of a cystic lesion at 7 mm at the left upper pole. A  large nodule that had been seen in the lateral left midpole and at the  lower left pole are not re demonstrated today. No other significant  Change.    ULTRASOUND THYROID 5/12/2017      HISTORY: Nontoxic single thyroid nodule.      COMPARISON: Thyroid ultrasound 7/10/2008.     FINDINGS: Thyroid ultrasound demonstrates a normal sized gland. The  right lobe measures 4.3 x 1.7 x 1.4 cm. The left lobe measures 4.3 x  1.6 x 1.7 cm. The isthmus is normal in thickness. Thyroid parenchyma  is heterogeneous in echotexture. Increased color Doppler flow is noted  throughout the gland, likely indicating underlying thyroiditis.     Thyroid nodules as follows:   Right Lobe: None.     Isthmus: None.     Left Lobe: None.         IMPRESSION: Normal-sized thyroid gland which is heterogeneous in  appearance. No discrete nodules. Increased color Doppler flow is  consistent with underlying thyroiditis.      All pertinent notes, labs, and images personally reviewed by me.   Available records, labs and images from outside clinic were personally reviewed.    A/P  Ms.Bronwyn ISAIAH Smiley is a 54 year old here for the management of:    1. Hypothyroidism secondary to Hashimoto's (+TPO):  Currently treated with levothyroxine 125 mcg/day.   She is on this dose since September 2018.  Clinically looks euthyroid  Plan:  Discussed diagnosis, pathophysiology, management and treatment options of condition with pt.  Will obtain TFT's today and adjust levo dose if indicated.  She had been on Cytomel in the past.  We will also check T3 today and consider T3 replacement based on labs    2.  History of thyroid nodules - now resolved:  Repeat thyroid ultrasound 2017 showed no nodules.  No history of thyroid cancer in family  No history of radiation  No compressive symptoms  Plan to continue to monitor    3. Decreased Orgasm:  I discussed that as long as thyroid labs are in acceptable range it should not be thyroid related  I encouraged her to continue to use estrace and follow-up with GYN for consideration of hormone replacement therapy    5.  Obesity:  Body mass index is 35.69 kg/m .  Not able to tolerate phentermine in the past  She continues to take Topamax 25 mg for migraine  She is following Nutrisystem and was able to lose some weight on it  The patient is advised to Make better food choices: reduce carbs, Reduce portion size, weight loss and exercise 3-4 times a week.    6. Hair loss:  Likely secondary to genetics and postmenopausal.  Previously testosterone was in normal range  She tried Rogaine for short time without much effect  Plan to get thyroid levels and adjust accordingly      More than 50% of the time spent with Ms. Smiley on counseling / coordinating her care.  Total face to face time was greater than or equal to 25 minutes.      Follow-up:  3-6 months/ based on labs    Narda Shelton MD  Endocrinology   [facility]  April 17, 2019  Fwd: Ijeoma Finn

## 2019-04-17 NOTE — PATIENT INSTRUCTIONS
[facility]   Dr Shelton, Endocrinology Department      [facility]   [address]  [place] [address]  Appointment [phone]  Fax: [phone]  [place]: Monday and Tuesday         [facility]   [address]  [place] [address]  Appointment [phone]  Fax: [phone]  [place]: Wednesday and Thursday            Labs today  Dose change based on that.    Take Levothyroxine on an empty stomach. Take it with a full glass of water at least 30 minutes to 1 hour before eating breakfast.   This medicine should be taken at least 4 hours before or 4 hours after these medicines: antacids (Maalox , Mylanta , Tums ), calcium supplements, cholestyramine (Prevalite , Questran ), colestipol (Colestid ), iron supplements, orlistat (Steven , Xenical ), simethicone (Gas-X , Mylicon ), and sucralfate (Carafate ).   Swallow the capsule whole. Do not cut or crush it.

## 2019-04-17 NOTE — LETTER
4/17/2019         RE: Barbara Smiley  [id number] [facility] [id number]        Dear Colleague,    Thank you for referring your patient, Barbara Smiley, to the [facility]. Please see a copy of my visit note below.    Name: Barbara Smiley   ALBIN from [facility].  Also saw  at .  Available records, labs and images from outside clinic were personally reviewed.    F/u for   Chief Complaint   Patient presents with     [facility]     self referral, hypo due to Hashimoto's and nodules, problems with orgasms, hair thinning, fa h/o thyroid issues,      Thyroid Disease     HPI:  Barbara Smiley is a 54 year old female who presents for the management of hypothyroidism and thyroid nodules.    Thyroid nodule :  she was initially noted to have an enlarged thyroid gland on exam during a routine GYN visit in 2007.   Follow up ultrasound showed bilateral thyroid nodules.  She was referred to Dr. Jose, endocrinologist, for further evaluation.  She states labs done by Dr. Jose showed hypothyroidism and Hashimoto's.  She was started on levothyroxine and a FNA biopsy was done.  She recalls the FNA biopsy being benign.    Her last thyroid ultrasound was done in 2008 - this showed right lobe nodules unchanged, a 1.3 cm solid left lobe nodule that was stable in size, and a newly noted 0.9 cm right isthmus nodule.  Follow-up ultrasound done in 2017 with [facility] did not identify any discrete nodules.    She denies any difficulty swallowing or voice changes.  No history of radiation treatments to the head or neck.  No known family history of thyroid cancer although + FH of hypothyroidism.    Hypothyroidism:  +TPO consistent with Hashimoto disease  Strong family history of hypothyroidism including mother and brother  Currently taking generic levothyroxine 125 mcg/day  On this dose X since 9/2018. Dose was increased at that time from 112 by   Reports  compliance.  Feeling OK  Enery levels are OK  She feels well at this time and denies any tachycardia, palpitations, heat intolerance, tremor, insomnia, diarrhea, or unexplained weight loss.  She also denies any symptoms of under replacement-no fatigue, cold intolerance, constipation, or unexplained weight gain.       Hair loss:  -- it continues  Ongoing X in 50s  Has not tried rogaine or spironolactone  Mother had similar problem in her old age    Decreased oragasm:  -- in last 6 months  S/p menopause- had ablation- had heavy cycles at that time  LMP was 10-12 years back  No dryness  On estrace tab-she was started on 8/20/2018 by   She was told that it can be related to thyroid so she has questions about it    Weight gain:  Body mass index is 35.69 kg/m .  Doing nutrisystem ( limits calories 1763-3226)  Thinks that she lost some weight  Walks and doing some weights  On topamax for migraines  Was seen at [facility]  in the past and was on phentermine but on phentermine she had jittery sensation and stopped using that        PMH/PSH:  Past Medical History:   Diagnosis Date     Chronic lymphocytic thyroiditis     Hashimoto thyroiditis     Esophageal reflux      Urinary tract infection, site not specified     in the past     Past Surgical History:   Procedure Laterality Date     ENT SURGERY  toddler    Tosillectomy     GYN SURGERY  2007    novasure     HYSTEROSCOPY  2008    removal of ovarian cyst and endometriosis      Family Hx:  Family History   Problem Relation Age of Onset     C.A.D. Mother         balloon surgery     Diabetes Mother      Cardiovascular Mother         atrial fibrillation      Thyroid Disease Mother      Cancer Mother         Esphogeal Cancer     C.A.D. Father         bypass      Diabetes Father      C.A.D. Paternal Grandmother      Diabetes Paternal Grandmother      C.A.D. Paternal Grandfather      Diabetes Paternal Grandfather      Breast Cancer No family hx of      Cancer - colorectal No family  hx of      Prostate Cancer No family hx of      Thyroid disease:  Yes, mother        DM2: Yes, Mother, father, PGM, PGF        Autoimmune: DM1, SLE, RA, Vitiligo     Social Hx:  Social History     Socioeconomic History     Marital status:      Spouse name: Not on file     Number of children: Not on file     Years of education: Not on file     Highest education level: Not on file   Occupational History     Not on file   Social Needs     Financial resource strain: Not on file     Food insecurity:     Worry: Not on file     Inability: Not on file     Transportation needs:     Medical: Not on file     Non-medical: Not on file   Tobacco Use     Smoking status: Never Smoker     Smokeless tobacco: Never Used   Substance and Sexual Activity     Alcohol use: Yes     Comment: a couple drinks per year     Drug use: No     Sexual activity: Yes     Partners: Male     Birth control/protection: Surgical   Lifestyle     Physical activity:     Days per week: Not on file     Minutes per session: Not on file     Stress: Not on file   Relationships     Social connections:     Talks on phone: Not on file     Gets together: Not on file     Attends Sikhism service: Not on file     Active member of club or organization: Not on file     Attends meetings of clubs or organizations: Not on file     Relationship status: Not on file     Intimate partner violence:     Fear of current or ex partner: Not on file     Emotionally abused: Not on file     Physically abused: Not on file     Forced sexual activity: Not on file   Other Topics Concern     Parent/sibling w/ CABG, MI or angioplasty before 65F 55M? No   Social History Narrative     Not on file          MEDICATIONS:  has a current medication list which includes the following prescription(s): amitriptyline, atorvastatin, calcium-vitamin d-vitamin k, cholecalciferol, levothyroxine, multivitamin adult, and topiramate.    ROS   ROS: 10 point ROS neg other than the symptoms noted above in  "the HPI.    Physical Exam   VS: /76 (BP Location: Left arm, Patient Position: Chair, Cuff Size: Adult Large)   Pulse 90   Temp 97.9  F (36.6  C) (Oral)   Ht 1.676 m (5' 6\")   Wt 100.3 kg (221 lb 1.6 oz)   LMP 08/01/2007   SpO2 98%   Breastfeeding? No   BMI 35.69 kg/m     GENERAL: AXOX3, NAD, well dressed, answering questions appropriately, appears stated age.  HEENT: no exophthalmos, no proptosis, EOMI, no lig lag, no retraction  NECK:  Supple, thyroid gland slightly enlarged and irregular - more prominent on the right, no distinct nodules palpable, no tenderness with palpation, no adenopathy  CV: RRR, no rubs, gallops, no murmurs  LUNGS: CTAB, no wheezes, rales, or rhonchi  ABDOMEN: soft, nontender, nondistended  EXTREMITIES: no edema, +pulses, no rashes, no lesions  NEUROLOGY: CN grossly intact, no tremors  MSK: grossly intact  SKIN: no rashes, no lesions    LABS:  TFTs:  ENDO THYROID LABS-[facility] Latest Ref Rng & Units 2/26/2018 8/17/2017   TSH 0.40 - 4.00 mU/L 5.36 (H) 0.34 (L)   T4 FREE 0.76 - 1.46 ng/dL 0.84 1.13     Component    Latest Ref Rng & Units 5/13/2017   Free T3    2.3 - 4.2 pg/mL 2.3   Triiodothyronine (T3)    60 - 181 ng/dL 87   TSH    0.40 - 4.00 mU/L 1.65   T4 Free    0.76 - 1.46 ng/dL 0.88   Thyroid Peroxidase Antibody    <35 IU/mL 191 (H)   Thyroglobulin Antibody    <40 IU/mL <20     !COMPREHENSIVE Latest Ref Rng & Units 5/13/2017   SODIUM 133 - 144 mmol/L 143   POTASSIUM 3.4 - 5.3 mmol/L 4.0   CHLORIDE 94 - 109 mmol/L 110 (H)   CO2 mmol/L    BUN 7 - 30 mg/dL 15   Creatinine 0.52 - 1.04 mg/dL 0.74   Glucose 70 - 99 mg/dL 100 (H)   ANION GAP 3 - 14 mmol/L 9   CALCIUM 8.5 - 10.1 mg/dL 9.2     !COMPREHENSIVE Latest Ref Rng & Units 10/26/2010 2/6/2013 2/25/2014   Glucose 70 - 99 mg/dL 110 (H) 85 102 (H)     !COMPREHENSIVE Latest Ref Rng & Units 5/13/2017   Glucose 70 - 99 mg/dL 100 (H)     Component    Latest Ref Rng & Units 5/13/2017   Hemoglobin A1C    4.3 - 6.0 % 5.5   C-Peptide    0.9 " "- 6.9 ng/mL 2.6   Insulin    3 - 25 mU/L 19.9     !LIPID/HEPATIC Latest Ref Rng & Units 5/13/2017   CHOLESTEROL <200 mg/dL 157   TRIGLYCERIDES <150 mg/dL 108   HDL CHOLESTEROL >49 mg/dL 45 (L)   LDL CHOLESTEROL, CALCULATED <100 mg/dL 90   VLDL-CHOLESTEROL 0 - 30 mg/dL    NON HDL CHOLESTEROL <130 mg/dL 112   CHOLESTEROL/HDL RATIO 0.0 - 5.0    AST 0 - 45 U/L 11   ALT 0 - 50 U/L 37     Component    Latest Ref Rng & Units 5/13/2017   WBC    4.0 - 11.0 10e9/L 7.8   RBC Count    3.8 - 5.2 10e12/L 4.56   Hemoglobin    11.7 - 15.7 g/dL 13.4   Hematocrit    35.0 - 47.0 % 40.0   MCV    78 - 100 fl 88   MCH    26.5 - 33.0 pg 29.4   MCHC    31.5 - 36.5 g/dL 33.5   RDW    10.0 - 15.0 % 13.0   Platelet Count    150 - 450 10e9/L 300   Sed Rate    0 - 30 mm/h 9     Component      Latest Ref Rng & Units 5/13/2017   Ferritin      8 - 252 ng/mL 112     Component    Latest Ref Rng & Units 5/13/2017   Vitamin D Deficiency screening    20 - 75 ug/L 48     Component    Latest Ref Rng & Units 5/12/2017   Cortisol Free Duration Urine    h 24 HR   Volume    mL 2950 ML   Cortisol ug/g creatinine   18 years and older: Less than 32 ug/g crt   16.83   Cortisol Free Urine Random     6.90   Cortisol Free Urine     20.4   Creat/Vol     41   Creat/24hr     1210     Vital Signs 2/28/2014 5/8/2017 5/22/2017 8/17/2017   Weight (LB) 232 lb 212 lb 221 lb 190 lb   Height 5' 8\" 5' 7.75\"     BMI (Calculated) 35.35 32.54       ULTRASOUND THYROID 10/25/2007     HISTORY: Thyroid nodule.     COMPARISON: None.     FINDINGS: The right and left lobes of the thyroid gland measure 4.9 x  1.9 x 1.6 cm and 4.8 x 1.9 x 1.5 cm, respectively. Several small  ill-defined hypoechoic nodules are present within both lobes of the  thyroid gland. The largest nodule is in the upper aspect of the left  lobe measuring 1.3 x 1.1 x 0.7 cm. All the other nodules are less than  1 cm in mean diameter. No microcalcifications associated with any of  the nodules.     IMPRESSION:  1. " Several small ill-defined nodules present within a normal sized  thyroid gland.  2. The largest nodule is a solid nodule in the upper aspect of the  left lobe measuring 1.3 x 1.1 x 0.7 cm.    EXAM:  US THYROID 7/10/2008     HISTORY:  Multinodular goiter.  Compare to previous US done 10/25/07.   Stat read and fax results to [phone], pt has 9am appt on  07/11/08.      FINDINGS: The study is compared with the previous of 10/25/2007.     The right lobe measures 4.9 cm in length by 1.7 x 1.9 cm. This is  unchanged. The left lobe measures 4.7 x 1.6 x 1.7 cm and is also  unchanged. No change in the AP dimension of the isthmus at just over 2  mm in diameter.     The nodules within the right lobe are unchanged. However there is a  new solid nodule measuring 9 mm by 4 x 7 within the right isthmus. In  the left upper pole there is a 13 mm solid nodule which is stable.  Stable appearance of a cystic lesion at 7 mm at the left upper pole. A  large nodule that had been seen in the lateral left midpole and at the  lower left pole are not re demonstrated today. No other significant  Change.    ULTRASOUND THYROID 5/12/2017      HISTORY: Nontoxic single thyroid nodule.      COMPARISON: Thyroid ultrasound 7/10/2008.     FINDINGS: Thyroid ultrasound demonstrates a normal sized gland. The  right lobe measures 4.3 x 1.7 x 1.4 cm. The left lobe measures 4.3 x  1.6 x 1.7 cm. The isthmus is normal in thickness. Thyroid parenchyma  is heterogeneous in echotexture. Increased color Doppler flow is noted  throughout the gland, likely indicating underlying thyroiditis.     Thyroid nodules as follows:   Right Lobe: None.     Isthmus: None.     Left Lobe: None.         IMPRESSION: Normal-sized thyroid gland which is heterogeneous in  appearance. No discrete nodules. Increased color Doppler flow is  consistent with underlying thyroiditis.      All pertinent notes, labs, and images personally reviewed by me.   Available records, labs and images  from outside clinic were personally reviewed.    A/P  Ms.Bronwyn ISAIAH Smiley is a 54 year old here for the management of:    1. Hypothyroidism secondary to Hashimoto's (+TPO):  Currently treated with levothyroxine 125 mcg/day.  She is on this dose since September 2018.  Clinically looks euthyroid  Plan:  Discussed diagnosis, pathophysiology, management and treatment options of condition with pt.  Will obtain TFT's today and adjust levo dose if indicated.  She had been on Cytomel in the past.  We will also check T3 today and consider T3 replacement based on labs    2.  History of thyroid nodules - now resolved:  Repeat thyroid ultrasound 2017 showed no nodules.  No history of thyroid cancer in family  No history of radiation  No compressive symptoms  Plan to continue to monitor    3. Decreased Orgasm:  I discussed that as long as thyroid labs are in acceptable range it should not be thyroid related  I encouraged her to continue to use estrace and follow-up with GYN for consideration of hormone replacement therapy    5.  Obesity:  Body mass index is 35.69 kg/m .  Not able to tolerate phentermine in the past  She continues to take Topamax 25 mg for migraine  She is following Nutrisystem and was able to lose some weight on it  The patient is advised to Make better food choices: reduce carbs, Reduce portion size, weight loss and exercise 3-4 times a week.    6. Hair loss:  Likely secondary to genetics and postmenopausal.  Previously testosterone was in normal range  She tried Rogaine for short time without much effect  Plan to get thyroid levels and adjust accordingly      More than 50% of the time spent with Ms. Smiley on counseling / coordinating her care.  Total face to face time was greater than or equal to 25 minutes.      Follow-up:  3-6 months/ based on labs    Narda Shelton MD  Endocrinology   [facility]  April 17, 2019  Fwd: [facility]      Again, thank you for allowing me to  participate in the care of your patient.        Sincerely,        Narda Shelton MD

## 2019-04-17 NOTE — NURSING NOTE
"ENDOCRINOLOGY INTAKE FORM    Patient Name:  Barbara Smiley  :  1965    Is patient Diabetic?   No  Does patient have non-diabetic or other endocrine issues?  Yes: hypo due to Hashimoto's    Vitals: /76 (BP Location: Left arm, Patient Position: Chair, Cuff Size: Adult Large)   Pulse 90   Temp 97.9  F (36.6  C) (Oral)   Ht 1.676 m (5' 6\")   Wt 100.3 kg (221 lb 1.6 oz)   LMP 2007   SpO2 98%   Breastfeeding? No   BMI 35.69 kg/m    BMI= Body mass index is 35.69 kg/m .    Flu vaccine:  Yes: 19  Pneumonia vaccine:  No    Smoking and Alcohol use:  Social History     Tobacco Use     Smoking status: Never Smoker     Smokeless tobacco: Never Used   Substance Use Topics     Alcohol use: Yes     Comment: a couple drinks per year     Drug use: No       Lauren Daniel CMA  [facility]  [facility]    "

## 2019-04-18 LAB
FSH SERPL-ACNC: 42 IU/L
T3FREE SERPL-MCNC: 2.4 PG/ML (ref 2.3–4.2)
T4 FREE SERPL-MCNC: 1.04 NG/DL (ref 0.76–1.46)
TSH SERPL DL<=0.005 MIU/L-ACNC: 0.44 MU/L (ref 0.4–4)

## 2019-04-25 ENCOUNTER — TELEPHONE (OUTPATIENT)
Dept: ENDOCRINOLOGY | Facility: CLINIC | Age: 54
End: 2019-04-25

## 2019-04-25 NOTE — TELEPHONE ENCOUNTER
Results for orders placed or performed in visit on 04/17/19   TSH   Result Value Ref Range    TSH 0.44 0.40 - 4.00 mU/L   T3 Free   Result Value Ref Range    Free T3 2.4 2.3 - 4.2 pg/mL   T4 free   Result Value Ref Range    T4 Free 1.04 0.76 - 1.46 ng/dL   Hemoglobin A1c   Result Value Ref Range    Hemoglobin A1C 5.5 0 - 5.6 %   Follicle stimulating hormone   Result Value Ref Range    FSH 42.0 IU/L

## 2019-04-25 NOTE — TELEPHONE ENCOUNTER
Reason for call:  Results       Additional comments:     Phone number to reach patient:  Cell number on file:    Telephone Information:   Mobile [phone]       Best Time:  anytime    Can we leave a detailed message on this number?  YES

## 2019-04-25 NOTE — TELEPHONE ENCOUNTER
ENDO THYROID LABS-[facility] Latest Ref Rng & Units 4/17/2019   TSH 0.40 - 4.00 mU/L 0.44   T4 FREE 0.76 - 1.46 ng/dL 1.04   FREE T3 2.3 - 4.2 pg/mL 2.4     Labs are WNL.  Called Barbara.  Not able to reach. Left VM.  Will send my chart message.

## 2019-05-24 ENCOUNTER — HOSPITAL ENCOUNTER (OUTPATIENT)
Dept: MAMMOGRAPHY | Facility: CLINIC | Age: 54
Discharge: HOME | End: 2019-05-24
Attending: FAMILY MEDICINE | Admitting: FAMILY MEDICINE
Payer: COMMERCIAL

## 2019-05-24 DIAGNOSIS — Z12.31 SCREENING MAMMOGRAM, ENCOUNTER FOR: ICD-10-CM

## 2019-05-24 PROCEDURE — 77067 SCR MAMMO BI INCL CAD: CPT

## 2019-12-15 ENCOUNTER — HEALTH MAINTENANCE LETTER (OUTPATIENT)
Age: 54
End: 2019-12-15

## 2020-06-12 ENCOUNTER — HOSPITAL ENCOUNTER (OUTPATIENT)
Dept: MAMMOGRAPHY | Facility: CLINIC | Age: 55
Discharge: HOME | End: 2020-06-12
Attending: FAMILY MEDICINE | Admitting: FAMILY MEDICINE
Payer: COMMERCIAL

## 2020-06-12 DIAGNOSIS — Z12.31 VISIT FOR SCREENING MAMMOGRAM: ICD-10-CM

## 2020-06-12 PROCEDURE — 77067 SCR MAMMO BI INCL CAD: CPT

## 2021-01-15 ENCOUNTER — HEALTH MAINTENANCE LETTER (OUTPATIENT)
Age: 56
End: 2021-01-15

## 2021-03-23 ENCOUNTER — TRANSFERRED RECORDS (OUTPATIENT)
Dept: HEALTH INFORMATION MANAGEMENT | Facility: CLINIC | Age: 56
End: 2021-03-23

## 2021-08-29 ENCOUNTER — HEALTH MAINTENANCE LETTER (OUTPATIENT)
Age: 56
End: 2021-08-29

## 2021-10-24 ENCOUNTER — HEALTH MAINTENANCE LETTER (OUTPATIENT)
Age: 56
End: 2021-10-24

## 2022-02-13 ENCOUNTER — HEALTH MAINTENANCE LETTER (OUTPATIENT)
Age: 57
End: 2022-02-13

## 2022-10-16 ENCOUNTER — HEALTH MAINTENANCE LETTER (OUTPATIENT)
Age: 57
End: 2022-10-16

## 2023-01-19 ENCOUNTER — TRANSFERRED RECORDS (OUTPATIENT)
Dept: HEALTH INFORMATION MANAGEMENT | Facility: CLINIC | Age: 58
End: 2023-01-19
Payer: COMMERCIAL

## 2023-01-25 ENCOUNTER — MEDICAL CORRESPONDENCE (OUTPATIENT)
Dept: HEALTH INFORMATION MANAGEMENT | Facility: CLINIC | Age: 58
End: 2023-01-25
Payer: COMMERCIAL

## 2023-02-08 ENCOUNTER — TRANSCRIBE ORDERS (OUTPATIENT)
Dept: OTHER | Age: 58
End: 2023-02-08

## 2023-02-08 DIAGNOSIS — G44.019: Primary | ICD-10-CM

## 2023-03-26 ENCOUNTER — HEALTH MAINTENANCE LETTER (OUTPATIENT)
Age: 58
End: 2023-03-26

## 2023-08-09 ENCOUNTER — OFFICE VISIT (OUTPATIENT)
Dept: NEUROLOGY | Facility: CLINIC | Age: 58
End: 2023-08-09
Attending: STUDENT IN AN ORGANIZED HEALTH CARE EDUCATION/TRAINING PROGRAM
Payer: COMMERCIAL

## 2023-08-09 ENCOUNTER — LAB (OUTPATIENT)
Dept: LAB | Facility: HOSPITAL | Age: 58
End: 2023-08-09
Payer: COMMERCIAL

## 2023-08-09 VITALS
HEART RATE: 72 BPM | RESPIRATION RATE: 18 BRPM | BODY MASS INDEX: 34.06 KG/M2 | WEIGHT: 211 LBS | SYSTOLIC BLOOD PRESSURE: 138 MMHG | DIASTOLIC BLOOD PRESSURE: 80 MMHG

## 2023-08-09 DIAGNOSIS — G43.711 INTRACTABLE CHRONIC MIGRAINE WITHOUT AURA AND WITH STATUS MIGRAINOSUS: Primary | ICD-10-CM

## 2023-08-09 DIAGNOSIS — G43.711 INTRACTABLE CHRONIC MIGRAINE WITHOUT AURA AND WITH STATUS MIGRAINOSUS: ICD-10-CM

## 2023-08-09 DIAGNOSIS — G43.809 OTHER MIGRAINE WITHOUT STATUS MIGRAINOSUS, NOT INTRACTABLE: ICD-10-CM

## 2023-08-09 LAB
ANION GAP SERPL CALCULATED.3IONS-SCNC: 10 MMOL/L (ref 7–15)
BUN SERPL-MCNC: 19.8 MG/DL (ref 6–20)
CALCIUM SERPL-MCNC: 9.3 MG/DL (ref 8.6–10)
CHLORIDE SERPL-SCNC: 108 MMOL/L (ref 98–107)
CREAT SERPL-MCNC: 0.78 MG/DL (ref 0.51–0.95)
DEPRECATED HCO3 PLAS-SCNC: 23 MMOL/L (ref 22–29)
FERRITIN SERPL-MCNC: 78 NG/ML (ref 11–328)
GFR SERPL CREATININE-BSD FRML MDRD: 88 ML/MIN/1.73M2
GLUCOSE SERPL-MCNC: 85 MG/DL (ref 70–99)
POTASSIUM SERPL-SCNC: 4.2 MMOL/L (ref 3.4–5.3)
SODIUM SERPL-SCNC: 141 MMOL/L (ref 136–145)
VIT B12 SERPL-MCNC: 624 PG/ML (ref 232–1245)

## 2023-08-09 PROCEDURE — 82607 VITAMIN B-12: CPT

## 2023-08-09 PROCEDURE — 80048 BASIC METABOLIC PNL TOTAL CA: CPT

## 2023-08-09 PROCEDURE — 99205 OFFICE O/P NEW HI 60 MIN: CPT | Performed by: PSYCHIATRY & NEUROLOGY

## 2023-08-09 PROCEDURE — 36415 COLL VENOUS BLD VENIPUNCTURE: CPT

## 2023-08-09 PROCEDURE — 82728 ASSAY OF FERRITIN: CPT

## 2023-08-09 RX ORDER — ZONISAMIDE 100 MG/1
100 CAPSULE ORAL DAILY
COMMUNITY
Start: 2018-02-06

## 2023-08-09 RX ORDER — ZONISAMIDE 50 MG/1
CAPSULE ORAL
Qty: 360 CAPSULE | Refills: 1 | Status: SHIPPED | OUTPATIENT
Start: 2023-08-09 | End: 2024-09-16

## 2023-08-09 NOTE — LETTER
8/9/2023         RE: Barbara Smiley  [address] [place] [id number]        Dear Colleague,    Thank you for referring your patient, Barbara Smiley, to the [facility]UROLOGY [facility] [place]. Please see a copy of my visit note below.    NEUROLOGY OUTPATIENT CONSULT NOTE   Aug 9, 2023     CHIEF COMPLAINT/REASON FOR VISIT/REASON FOR CONSULT  Patient presents with:  Headache    REASON FOR CONSULTATION- Headaches    REFERRAL SOURCE  Dr. Mckenna Smith  CC Dr. Mckenna Smith    HISTORY OF PRESENT ILLNESS  Barbara Smiley is a 58 year old female seen today for evaluation of headaches.  She reports that she has had headaches since the last 7 years.  Before that she did not have any headaches.  She was admitted to the hospital at that time for 2-week.  She was having really severe headaches.  These headaches were generally in the left temple.  They were sharp and pulsating.  There was no photophobia or phonophobia.  There was no eye watering or redness of her eyes.  She was diagnosed with cluster headaches.  Has been on Zonegran since then.  There is no associated nausea either.  No visual auras.  These headaches/clusters have not reoccurred since then.    She also has less severe headaches on a quick basis.  These are generally in the left cervical region and then radiate all over her head.  She does take Aleve which helps get rid of these headaches.  These are about 3-4 times a month.  Denies any photophobia or phonophobia with these headaches.  No visual auras.  Drinks plenty of water every days.  No other triggers that she knows of.  Other than the Zonegran she has not really tried any other medications.  Amitriptyline and Topamax are listed on medication list.  She would like to get off the Zonegran to see if she needs to be on it long-term or not.    Previous history is reviewed and this is unchanged.    PAST MEDICAL/SURGICAL HISTORY  Past Medical History:   Diagnosis  Date     Chronic lymphocytic thyroiditis     Hashimoto thyroiditis     Esophageal reflux      Urinary tract infection, site not specified     in the past     Patient Active Problem List   Diagnosis     Conductive hearing loss     Hypothyroidism     Chronic lymphocytic thyroiditis     Adjustment disorder with mixed anxiety and depressed mood     High BMI     Hyperlipidemia LDL goal <130     Impaired fasting glucose     Tension headache   Significant high cholesterol, migraines, bladder infections.    FAMILY HISTORY  Family History   Problem Relation Age of Onset     C.A.D. Mother         balloon surgery     Diabetes Mother      Cardiovascular Mother         atrial fibrillation      Thyroid Disease Mother      Cancer Mother         Esphogeal Cancer     C.A.D. Father         bypass      Diabetes Father      C.A.D. Paternal Grandmother      Diabetes Paternal Grandmother      C.A.D. Paternal Grandfather      Diabetes Paternal Grandfather      Breast Cancer No family hx of      Cancer - colorectal No family hx of      Prostate Cancer No family hx of    Negative for headaches    SOCIAL HISTORY  Social History     Tobacco Use     Smoking status: Never     Smokeless tobacco: Never   Substance Use Topics     Alcohol use: Yes     Comment: a couple drinks per year     Drug use: No       SYSTEMS REVIEW  Twelve-system ROS was done and other than the HPI this was negative/positive for bladder symptoms, ringing in the ears, hearing loss, headaches, weight gain//intentional weight loss    MEDICATIONS  atorvastatin (LIPITOR) 10 MG tablet, Take 1 tablet (10 mg) by mouth daily  levothyroxine (SYNTHROID/LEVOTHROID) 125 MCG tablet, Take 1 tablet (125 mcg) by mouth daily  zonisamide (ZONEGRAN) 100 MG capsule, Take 100 mg by mouth daily  amitriptyline (ELAVIL) 25 MG tablet, Take 2 tablets (50 mg) by mouth At Bedtime  calcium-vitamin D-vitamin K (VIACTIV) 500-500-40 MG-UNT-MCG CHEW, Take 2 tablets by mouth daily In the  Afternoon  cholecalciferol (VITAMIN D3) 5000 units TABS tablet, Take 1 tablet (5,000 Units) by mouth daily  Multiple Vitamins-Minerals (MULTIVITAMIN ADULT) CHEW, Take 2 chew tab by mouth daily  topiramate (TOPAMAX) 25 MG tablet,     No current facility-administered medications on file prior to visit.       PHYSICAL EXAMINATION  VITALS: /80   Pulse 72   Resp 18   Wt 95.7 kg (211 lb)   LMP 08/01/2007   BMI 34.06 kg/m    GENERAL: Healthy appearing, alert, no acute distress, normal habitus.  CARDIOVASCULAR: Extremities warm and well perfused. Pulses present.   NEUROLOGICAL:  Patient is awake and oriented to self, place and time.  Attention span is normal.  Memory is grossly intact.  Language is fluent and follows commands appropriately.  Appropriate fund of knowledge. Cranial nerves 2-12 are intact. There is no pronator drift.  Motor exam shows 5/5 strength in all extremities.  Tone is symmetric bilaterally in upper and lower extremities.  Reflexes are symmetric and 2+ in upper extremities and lower extremities. Sensory exam is grossly intact to light touch, pin prick and vibration.  Finger to nose and heel to shin is without dysmetria.  Romberg is negative.  Gait is normal and the patient is able to do tandem walk and walk on toes and heels.  Significant hearing loss.      DIAGNOSTICS  US Neck/Head  FINDINGS: Ultrasound images of the left inferior lower neck demonstrates 3 mildly prominent hypoechoic lymph nodes measuring 1.1 x 0.5 x 1.2 cm, 1.5 x 0.9 x 1.1 cm, and 1.4 x 0.8 x 1 cm. These may be reactive as patient has had recent COVID vaccine. Recommend a repeat ultrasound in 2-3 months to assure resolution.     MRV 2018  IMPRESSION: Negative MR venography of the dural venous sinuses.     MRI 2018  IMPRESSION:  1. Few tiny nonspecific white matter lesions.  2. No evidence for intracranial hemorrhage or any acute process.    CTA 2018  IMPRESSION: Normal CT angiogram of the head and neck.         Component       Latest Ref Rng 2/23/2018  5:24 AM 2/23/2018  2:45 PM   WBC CSF      0 - 5 /uL  1    RBC CSF      0 - 2 /uL  2    Tube Number      #  4    Color CSF      CLRL^Colorless   Colorless    Appearance CSF      CLER^Clear   Clear    Specimen Description  Cerebrospinal fluid    Specimen Description  Cerebrospinal fluid    Gram Stain Smear  No organisms seen    Gram Stain Smear  No WBC's seen    Gram Stain Smear  Gram stain slide reviewed at the [facility]     Herpes Simplex Type 1 CSF      NDET^Not Detected   Not Detected    Herpes Simplex Type 2 CSF      NDET^Not Detected   Not Detected    HSV CSF Comment  The [employer] Simplexa HSV 1 & 2 Direct assay is a FDA approved, real-time PCR      Culture Micro  No growth    CRP Inflammation      0.0 - 8.0 mg/L 4.4     Sed Rate      0 - 30 mm/h 7     Glucose CSF      40 - 70 mg/dL  80 (H)    Protein Total CSF      15 - 60 mg/dL  42       Legend:  (H) High    OUTSIDE RECORDS  Outside referral notes and chart notes were reviewed and pertinent information has been summarized (in addition to the HPI):-      IMPRESSION/REPORT/PLAN  Migraine headaches  Question of cluster headaches/status migrainosus    This is a 58 year old female with aches.  Her headaches clinically are suggestive of migrainous headaches.  Exam today is noncontributory.  MRI/MRA/MRV in 2018 was negative for any structural lesions.  Will hold off on repeating the imaging today.  Will check blood work to look for any causes of headaches.  Encouraged her to keep a log to identify any patterns.    Zonegran previously was helping though her headaches are less than once a week and she might not need Zonegran.  Will slowly wean her off and see how she does.  She can continue using Aleve for abortive therapy.     Patient had a series of headaches for 2 weeks about 7 years ago.  She was diagnosed to have cluster headaches though based on clinical description these are more  suggestive of migraine/status migrainosus.  Will continue to monitor.  She has not had any severe headaches for the last 7 years.  There was some stress going on at that time which could be a cause for her headaches.  She has not retired and the stress is gone away which might be further helping reduce the frequency/severity of the headaches.    Discussed triggers/risk factors for headaches.  I can see her back in 6 months.    -     Vitamin B12; Future  -     Basic metabolic panel; Future  -     Ferritin; Future  -     zonisamide (ZONEGRAN) 50 MG capsule; Take 150 mg every night. Reduce by 50 mg every month till off the medication. If headaches worse go back on 200 mg/night.    Return in about 6 months (around 2/9/2024) for In-Clinic Visit (must), After testing.    Over 60 minutes were spent coordinating the care for the patient on the day of the encounter.  This includes previsit, during visit and post visit activities as documented above.  Counseling patient with prescription management.  Testing ordered.  Reviewing previous testing and chart/outside notes.  (Activities include but not inclusive of reviewing chart, reviewing outside records, reviewing labs and imaging study results as well as the images, patient visit time including getting history and exam,  use if applicable, review of test results with the patient and coming up with a plan in a shared model, counseling patient and family, education and answering patient questions, EMR , EMR diagnosis entry and problem list management, medication reconciliation and prescription management if applicable, paperwork if applicable, printing documents and documentation of the visit activities.)        Adam Brown MD  Neurologist  [facility]  Tel:- [phone]    This note was dictated using voice recognition software.  Any grammatical or context distortions are unintentional and inherent to the  software.      Again, thank you for allowing me to participate in the care of your patient.        Sincerely,        Adam Brown MD

## 2023-08-09 NOTE — PROGRESS NOTES
NEUROLOGY OUTPATIENT CONSULT NOTE   Aug 9, 2023     CHIEF COMPLAINT/REASON FOR VISIT/REASON FOR CONSULT  Patient presents with:  Headache    REASON FOR CONSULTATION- Headaches    REFERRAL SOURCE  Dr. Mckenna Smith  CC Dr. Mckenna Smith    HISTORY OF PRESENT ILLNESS  Barbara Smiley is a 58 year old female seen today for evaluation of headaches.  She reports that she has had headaches since the last 7 years.  Before that she did not have any headaches.  She was admitted to the hospital at that time for 2-week.  She was having really severe headaches.  These headaches were generally in the left temple.  They were sharp and pulsating.  There was no photophobia or phonophobia.  There was no eye watering or redness of her eyes.  She was diagnosed with cluster headaches.  Has been on Zonegran since then.  There is no associated nausea either.  No visual auras.  These headaches/clusters have not reoccurred since then.    She also has less severe headaches on a quick basis.  These are generally in the left cervical region and then radiate all over her head.  She does take Aleve which helps get rid of these headaches.  These are about 3-4 times a month.  Denies any photophobia or phonophobia with these headaches.  No visual auras.  Drinks plenty of water every days.  No other triggers that she knows of.  Other than the Zonegran she has not really tried any other medications.  Amitriptyline and Topamax are listed on medication list.  She would like to get off the Zonegran to see if she needs to be on it long-term or not.    Previous history is reviewed and this is unchanged.    PAST MEDICAL/SURGICAL HISTORY  Past Medical History:   Diagnosis Date    Chronic lymphocytic thyroiditis     Hashimoto thyroiditis    Esophageal reflux     Urinary tract infection, site not specified     in the past     Patient Active Problem List   Diagnosis    Conductive hearing loss    Hypothyroidism    Chronic lymphocytic thyroiditis     Adjustment disorder with mixed anxiety and depressed mood    High BMI    Hyperlipidemia LDL goal <130    Impaired fasting glucose    Tension headache   Significant high cholesterol, migraines, bladder infections.    FAMILY HISTORY  Family History   Problem Relation Age of Onset    C.A.D. Mother         balloon surgery    Diabetes Mother     Cardiovascular Mother         atrial fibrillation     Thyroid Disease Mother     Cancer Mother         Esphogeal Cancer    C.A.D. Father         bypass     Diabetes Father     C.A.D. Paternal Grandmother     Diabetes Paternal Grandmother     C.A.D. Paternal Grandfather     Diabetes Paternal Grandfather     Breast Cancer No family hx of     Cancer - colorectal No family hx of     Prostate Cancer No family hx of    Negative for headaches    SOCIAL HISTORY  Social History     Tobacco Use    Smoking status: Never    Smokeless tobacco: Never   Substance Use Topics    Alcohol use: Yes     Comment: a couple drinks per year    Drug use: No       SYSTEMS REVIEW  Twelve-system ROS was done and other than the HPI this was negative/positive for bladder symptoms, ringing in the ears, hearing loss, headaches, weight gain//intentional weight loss    MEDICATIONS  atorvastatin (LIPITOR) 10 MG tablet, Take 1 tablet (10 mg) by mouth daily  levothyroxine (SYNTHROID/LEVOTHROID) 125 MCG tablet, Take 1 tablet (125 mcg) by mouth daily  zonisamide (ZONEGRAN) 100 MG capsule, Take 100 mg by mouth daily  amitriptyline (ELAVIL) 25 MG tablet, Take 2 tablets (50 mg) by mouth At Bedtime  calcium-vitamin D-vitamin K (VIACTIV) 500-500-40 MG-UNT-MCG CHEW, Take 2 tablets by mouth daily In the Afternoon  cholecalciferol (VITAMIN D3) 5000 units TABS tablet, Take 1 tablet (5,000 Units) by mouth daily  Multiple Vitamins-Minerals (MULTIVITAMIN ADULT) CHEW, Take 2 chew tab by mouth daily  topiramate (TOPAMAX) 25 MG tablet,     No current facility-administered medications on file prior to visit.       PHYSICAL  EXAMINATION  VITALS: /80   Pulse 72   Resp 18   Wt 95.7 kg (211 lb)   LMP 08/01/2007   BMI 34.06 kg/m    GENERAL: Healthy appearing, alert, no acute distress, normal habitus.  CARDIOVASCULAR: Extremities warm and well perfused. Pulses present.   NEUROLOGICAL:  Patient is awake and oriented to self, place and time.  Attention span is normal.  Memory is grossly intact.  Language is fluent and follows commands appropriately.  Appropriate fund of knowledge. Cranial nerves 2-12 are intact. There is no pronator drift.  Motor exam shows 5/5 strength in all extremities.  Tone is symmetric bilaterally in upper and lower extremities.  Reflexes are symmetric and 2+ in upper extremities and lower extremities. Sensory exam is grossly intact to light touch, pin prick and vibration.  Finger to nose and heel to shin is without dysmetria.  Romberg is negative.  Gait is normal and the patient is able to do tandem walk and walk on toes and heels.  Significant hearing loss.      DIAGNOSTICS  US Neck/Head  FINDINGS: Ultrasound images of the left inferior lower neck demonstrates 3 mildly prominent hypoechoic lymph nodes measuring 1.1 x 0.5 x 1.2 cm, 1.5 x 0.9 x 1.1 cm, and 1.4 x 0.8 x 1 cm. These may be reactive as patient has had recent COVID vaccine. Recommend a repeat ultrasound in 2-3 months to assure resolution.     MRV 2018  IMPRESSION: Negative MR venography of the dural venous sinuses.     MRI 2018  IMPRESSION:  1. Few tiny nonspecific white matter lesions.  2. No evidence for intracranial hemorrhage or any acute process.    CTA 2018  IMPRESSION: Normal CT angiogram of the head and neck.         Component      Latest Ref Rng 2/23/2018  5:24 AM 2/23/2018  2:45 PM   WBC CSF      0 - 5 /uL  1    RBC CSF      0 - 2 /uL  2    Tube Number      #  4    Color CSF      CLRL^Colorless   Colorless    Appearance CSF      CLER^Clear   Clear    Specimen Description  Cerebrospinal fluid    Specimen Description  Cerebrospinal fluid     Gram Stain Smear  No organisms seen    Gram Stain Smear  No WBC's seen    Gram Stain Smear  Gram stain slide reviewed at the [facility]     Herpes Simplex Type 1 CSF      NDET^Not Detected   Not Detected    Herpes Simplex Type 2 CSF      NDET^Not Detected   Not Detected    HSV CSF Comment  The [employer] Simplexa HSV 1 & 2 Direct assay is a FDA approved, real-time PCR      Culture Micro  No growth    CRP Inflammation      0.0 - 8.0 mg/L 4.4     Sed Rate      0 - 30 mm/h 7     Glucose CSF      40 - 70 mg/dL  80 (H)    Protein Total CSF      15 - 60 mg/dL  42       Legend:  (H) High    OUTSIDE RECORDS  Outside referral notes and chart notes were reviewed and pertinent information has been summarized (in addition to the HPI):-      IMPRESSION/REPORT/PLAN  Migraine headaches  Question of cluster headaches/status migrainosus    This is a 58 year old female with aches.  Her headaches clinically are suggestive of migrainous headaches.  Exam today is noncontributory.  MRI/MRA/MRV in 2018 was negative for any structural lesions.  Will hold off on repeating the imaging today.  Will check blood work to look for any causes of headaches.  Encouraged her to keep a log to identify any patterns.    Zonegran previously was helping though her headaches are less than once a week and she might not need Zonegran.  Will slowly wean her off and see how she does.  She can continue using Aleve for abortive therapy.     Patient had a series of headaches for 2 weeks about 7 years ago.  She was diagnosed to have cluster headaches though based on clinical description these are more suggestive of migraine/status migrainosus.  Will continue to monitor.  She has not had any severe headaches for the last 7 years.  There was some stress going on at that time which could be a cause for her headaches.  She has not retired and the stress is gone away which might be further helping reduce the  frequency/severity of the headaches.    Discussed triggers/risk factors for headaches.  I can see her back in 6 months.    -     Vitamin B12; Future  -     Basic metabolic panel; Future  -     Ferritin; Future  -     zonisamide (ZONEGRAN) 50 MG capsule; Take 150 mg every night. Reduce by 50 mg every month till off the medication. If headaches worse go back on 200 mg/night.    Return in about 6 months (around 2/9/2024) for In-Clinic Visit (must), After testing.    Over 60 minutes were spent coordinating the care for the patient on the day of the encounter.  This includes previsit, during visit and post visit activities as documented above.  Counseling patient with prescription management.  Testing ordered.  Reviewing previous testing and chart/outside notes.  (Activities include but not inclusive of reviewing chart, reviewing outside records, reviewing labs and imaging study results as well as the images, patient visit time including getting history and exam,  use if applicable, review of test results with the patient and coming up with a plan in a shared model, counseling patient and family, education and answering patient questions, EMR , EMR diagnosis entry and problem list management, medication reconciliation and prescription management if applicable, paperwork if applicable, printing documents and documentation of the visit activities.)        Adam Brown MD  Neurologist  [facility] Neurology [facility]  Tel:- [phone]    This note was dictated using voice recognition software.  Any grammatical or context distortions are unintentional and inherent to the software.

## 2023-11-04 ENCOUNTER — HEALTH MAINTENANCE LETTER (OUTPATIENT)
Age: 58
End: 2023-11-04

## 2023-11-15 ENCOUNTER — TELEPHONE (OUTPATIENT)
Dept: NEUROLOGY | Facility: CLINIC | Age: 58
End: 2023-11-15
Payer: COMMERCIAL

## 2023-11-15 DIAGNOSIS — G43.711 INTRACTABLE CHRONIC MIGRAINE WITHOUT AURA AND WITH STATUS MIGRAINOSUS: Primary | ICD-10-CM

## 2023-11-15 NOTE — TELEPHONE ENCOUNTER
[facility] Call Center    Phone Message    May a detailed message be left on voicemail: yes     Reason for Call: Medication Question or concern regarding medication   Prescription Clarification  Name of Medication:   zonisamide (ZONEGRAN) 50 MG capsule     Prescribing Provider:   Adam Brown     Pharmacy: NA       What on the order needs clarification?   Pt is requesting a call back to discuss the medication listed above. Pt states that she has sent over messages regarding her concerns of weaning off of this medication and that nobody has reached out to her.  Patient is upset and would like to further discuss.     Writer unable to confirm any messages from patient    Please reach out to pt to further advise at  # [phone] (Mobile)       Action Taken: Other: [facility]     Travel Screening: Not Applicable

## 2023-11-16 NOTE — TELEPHONE ENCOUNTER
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

## 2023-11-16 NOTE — TELEPHONE ENCOUNTER
Called and left message for patient, encouraged call back to discuss questions related to the medication, zonisamide.    Last encounter was 8/9/2023 appointment with plan for this medication:    Zonisamide (ZONEGRAN) 50 MG capsule; Take 150 mg every night. Reduce by 50 mg every month till off the medication. If headaches worse go back on 200 mg/night.       Will discuss further with patient upon call back.    Lauri Palacios RN, BSN  [facility] Neurology

## 2023-11-16 NOTE — TELEPHONE ENCOUNTER
Returned call to patient, and left message again. Unable to reach patient though encouraged to call back and also to detail more about concerns related to the medication, Zonisamide.    Upon callback please provide good timeframe to reach patient and nursing will do their best to make a callback at that time.    Patient may also be encouraged to use [employer] as another route for sending us a message that we can respond to.    Lauri Palacios RN, BSN  [facility] Neurology

## 2023-11-20 NOTE — TELEPHONE ENCOUNTER
Attempt a return call to patient to discuss questions/concerns. Encouraged return call to clinic when patient available to discuss.    Lauri Palacios RN, BSN  [facility] Neurology

## 2023-11-21 ENCOUNTER — TELEPHONE (OUTPATIENT)
Dept: NEUROLOGY | Facility: CLINIC | Age: 58
End: 2023-11-21
Payer: COMMERCIAL

## 2023-11-21 NOTE — TELEPHONE ENCOUNTER
[facility] Call Center    Phone Message    May a detailed message be left on voicemail: yes     Reason for Call: Other: Pt was returning a call back from Lauri. Pt was upset with writer as she states she has been playing phone tag for multiple days with no answers. Pt states she put herslef back on the medication as she has not heard back from the care team. Pt states she refuses to use Mychart as she states her questions and concerns were not answered. Patient states she will be available tomorrow and asked to call her at [phone].    Action Taken: Message routed to:  Other: [facility]     Travel Screening: Not Applicable

## 2023-11-21 NOTE — TELEPHONE ENCOUNTER
[facility] Call Center    Phone Message    May a detailed message be left on voicemail: yes     Reason for Call: Medication Question or concern regarding medication   Prescription Clarification  Name of Medication: Rizatriptan  Prescribing Provider: N/A   Pharmacy: N/A   What on the order needs clarification? Patient is requesting a refill on this medication. Pt states that Dr. Brown has not prescribed her this medication before but she uses it for cluster headaches and would like the medication going into the holiday weekend. Pt is requesting a call back to discuss further. Please contact pt at [phone]    Action Taken: Message routed to:  [facility]     Travel Screening: Not Applicable

## 2023-11-22 RX ORDER — RIZATRIPTAN BENZOATE 10 MG/1
10 TABLET ORAL
Qty: 18 TABLET | Refills: 1 | Status: SHIPPED | OUTPATIENT
Start: 2023-11-22

## 2023-11-22 NOTE — TELEPHONE ENCOUNTER
Called and discussed provider recommendations with regards to tapering up Zonisamide. Also conveyed that Rizatriptan Rx was sent to pharmacy as requested.    They are agreeable with plan and will call back as needed.    Lauri Palacios RN, BSN  [facility] Neurology

## 2023-11-22 NOTE — TELEPHONE ENCOUNTER
1.  We will need to slowly go up on the Zonegran.  Increase by 50 mg every week.  Maximum dose 200 mg a day.  2.  Okay to refill rizatriptan

## 2023-11-22 NOTE — TELEPHONE ENCOUNTER
Called and spoke with patient about concerns related to new onset of some headache symptoms.  Patient did refill of Zonisamide (which had been tapered off of) and is currently taking one 50 mg capsule.  Patient also states they have been  prescription for rizatriptan (that they haven't used in years per report) that they would like provider to reorder in hopes to avoid any reoccurring cluster headaches as in the past.    Patient currently feels okay on the okay on the 50mg Zonisamide, but wonders how to increase this for headace symptoms if they increase.    MD to please advise.    1) What is the plan for patient taking Zonisamide, should they continue taper up slowly increasing by 50 mg or return to the 200 mg at night, as on symptoms?    2) Are you okay providing prescription for the Rizatriptan, for patient to use as abortive headache therapy? If refilling, patient uses [facility]/pharmacy #[id number] - [place], [place] - [phone] RN, BSN  [facility]

## 2024-01-23 DIAGNOSIS — G43.711 INTRACTABLE CHRONIC MIGRAINE WITHOUT AURA AND WITH STATUS MIGRAINOSUS: ICD-10-CM

## 2024-01-23 RX ORDER — ZONISAMIDE 50 MG/1
CAPSULE ORAL
Qty: 270 CAPSULE | Refills: 2 | OUTPATIENT
Start: 2024-01-23

## 2024-02-26 DIAGNOSIS — G43.711 INTRACTABLE CHRONIC MIGRAINE WITHOUT AURA AND WITH STATUS MIGRAINOSUS: ICD-10-CM

## 2024-02-27 RX ORDER — ZONISAMIDE 50 MG/1
CAPSULE ORAL
Qty: 4 CAPSULE | Refills: 0 | OUTPATIENT
Start: 2024-02-27

## 2024-09-12 DIAGNOSIS — G43.711 INTRACTABLE CHRONIC MIGRAINE WITHOUT AURA AND WITH STATUS MIGRAINOSUS: ICD-10-CM

## 2024-09-12 NOTE — TELEPHONE ENCOUNTER
[employer] message sent to pt clarifying if she is still taking this medication and if so, what dosage.    Naomi Sevilla LPN on 9/12/2024 at 11:14 AM

## 2024-09-13 NOTE — TELEPHONE ENCOUNTER
Left message for pt to call back or reply back to [employer] message.  Need to clarify if pt is still taking, and if so, what dosage she is on.    Naomi Sevilla LPN on 9/13/2024 at 2:14 PM

## 2024-09-16 RX ORDER — ZONISAMIDE 50 MG/1
50 CAPSULE ORAL AT BEDTIME
Qty: 270 CAPSULE | Refills: 0 | Status: SHIPPED | OUTPATIENT
Start: 2024-09-16 | End: 2025-06-13

## 2024-09-16 NOTE — TELEPHONE ENCOUNTER
Spoke with Barbara and she tried to taper off of zonisamide but headaches returned. She is taking 50mg at bedtime and would like a refill. She is due for follow up but will be in [place] for the winter. Scheduled follow up for April 2025    Zonisamide 50mg at bedtime Medication T'd for review and signature  Ariadna Blue CMA on 9/16/2024 at 2:00 PM  [facility]

## 2024-10-19 ENCOUNTER — HEALTH MAINTENANCE LETTER (OUTPATIENT)
Age: 59
End: 2024-10-19

## 2024-12-22 ENCOUNTER — HEALTH MAINTENANCE LETTER (OUTPATIENT)
Age: 59
End: 2024-12-22

## 2025-03-04 ENCOUNTER — TRANSFERRED RECORDS (OUTPATIENT)
Dept: MULTI SPECIALTY CLINIC | Facility: CLINIC | Age: 60
End: 2025-03-04
Payer: COMMERCIAL

## 2025-03-05 ENCOUNTER — OFFICE VISIT (OUTPATIENT)
Dept: FAMILY MEDICINE | Facility: CLINIC | Age: 60
End: 2025-03-05
Payer: COMMERCIAL

## 2025-03-05 VITALS
WEIGHT: 220.7 LBS | DIASTOLIC BLOOD PRESSURE: 72 MMHG | RESPIRATION RATE: 16 BRPM | HEIGHT: 68 IN | OXYGEN SATURATION: 96 % | SYSTOLIC BLOOD PRESSURE: 116 MMHG | HEART RATE: 80 BPM | TEMPERATURE: 98.2 F | BODY MASS INDEX: 33.45 KG/M2

## 2025-03-05 DIAGNOSIS — R35.0 URINARY FREQUENCY: ICD-10-CM

## 2025-03-05 DIAGNOSIS — R30.0 DYSURIA: Primary | ICD-10-CM

## 2025-03-05 LAB
ALBUMIN UR-MCNC: NEGATIVE MG/DL
APPEARANCE UR: CLEAR
BACTERIA #/AREA URNS HPF: ABNORMAL /HPF
BILIRUB UR QL STRIP: NEGATIVE
COLOR UR AUTO: ABNORMAL
GLUCOSE UR STRIP-MCNC: NEGATIVE MG/DL
HGB UR QL STRIP: NEGATIVE
KETONES UR STRIP-MCNC: ABNORMAL MG/DL
LEUKOCYTE ESTERASE UR QL STRIP: ABNORMAL
MUCOUS THREADS #/AREA URNS LPF: PRESENT /LPF
NITRATE UR QL: NEGATIVE
PH UR STRIP: 7 [PH] (ref 5–7)
RBC #/AREA URNS AUTO: ABNORMAL /HPF
SP GR UR STRIP: 1.02 (ref 1–1.03)
SQUAMOUS #/AREA URNS AUTO: ABNORMAL /LPF
UROBILINOGEN UR STRIP-ACNC: 1 E.U./DL
WBC #/AREA URNS AUTO: ABNORMAL /HPF
WBC CLUMPS #/AREA URNS HPF: PRESENT /HPF

## 2025-03-05 PROCEDURE — 81001 URINALYSIS AUTO W/SCOPE: CPT | Performed by: PHYSICIAN ASSISTANT

## 2025-03-05 RX ORDER — ESTRADIOL 0.1 MG/G
CREAM VAGINAL
COMMUNITY
Start: 2023-07-31 | End: 2025-03-05

## 2025-03-05 RX ORDER — SPIRONOLACTONE 100 MG/1
100 TABLET, FILM COATED ORAL DAILY
COMMUNITY

## 2025-03-05 RX ORDER — NITROFURANTOIN 25; 75 MG/1; MG/1
100 CAPSULE ORAL 2 TIMES DAILY
Qty: 10 CAPSULE | Refills: 0 | Status: SHIPPED | OUTPATIENT
Start: 2025-03-05 | End: 2025-03-05

## 2025-03-05 RX ORDER — NITROFURANTOIN 25; 75 MG/1; MG/1
100 CAPSULE ORAL 2 TIMES DAILY
Qty: 14 CAPSULE | Refills: 0 | Status: SHIPPED | OUTPATIENT
Start: 2025-03-05

## 2025-03-05 ASSESSMENT — ENCOUNTER SYMPTOMS: DYSURIA: 1

## 2025-03-05 NOTE — PROGRESS NOTES
"  Assessment & Plan     (R30.0) Dysuria  (primary encounter diagnosis)  Comment: Will treat as UTI  Plan: UA Macroscopic with reflex to Microscopic and         Culture, UA Microscopic with Reflex to Culture,        Urine Culture Aerobic Bacterial - lab collect,         nitroFURantoin macrocrystal-monohydrate         (MACROBID) 100 MG capsule        Macrobid push fluids follow as needed symptoms persist may need to follow-up with her primary and/or go back to urology    (R35.0) Urinary frequency  Comment:   Plan: UA Macroscopic with reflex to Microscopic and         Culture, UA Microscopic with Reflex to Culture,        Urine Culture Aerobic Bacterial - lab collect                  BMI  Estimated body mass index is 33.56 kg/m  as calculated from the following:    Height as of this encounter: 1.727 m (5' 8\").    Weight as of this encounter: 100.1 kg (220 lb 11.2 oz).             Alexandra Jimenez is a 59 year old, presenting for the following health issues: patient has been having UTI and other vaginal issues since the beginning of January, treated back then for UTI, a few weeks later BV., just finished Metronidazole oral and Fluconazole, still having vaginal irritation and discomfort with urinating no fever chills or flank pain history of recurrent UTI  Dysuria and Urinary Frequency        3/5/2025     2:58 PM   Additional Questions   Roomed by rizwan chavez   Accompanied by self     Via the Health Maintenance questionnaire, the patient has reported the following services have been completed -Mammogram: rayus 2025-03-04, this information has been sent to the abstraction team.    Dysuria    History of Present Illness       Reason for visit:  Poasible uti  Symptom onset:  1-3 days ago   She is taking medications regularly.      Genitourinary - Female    Painful urination (Dysuria): YES           Frequency: YES  Blood in urine (Hematuria): No  Delay in urine (Hesitency): No  Accompanying Signs & Symptoms:  Fever/chills: " "No  Flank pain: No  Nausea and vomiting: No  Vaginal symptoms: vaginal irritation  Abdominal/Pelvic Pain: No  History:   History of frequent UTI s: YES  History of kidney stones: No  Sexually Active: YES  Possibility of pregnancy: No              Objective    /72 (BP Location: Right arm, Patient Position: Sitting)   Pulse 80   Temp 98.2  F (36.8  C)   Resp 16   Ht 1.727 m (5' 8\")   Wt 100.1 kg (220 lb 11.2 oz)   LMP 08/01/2007   SpO2 96%   Breastfeeding No   BMI 33.56 kg/m    Body mass index is 33.56 kg/m .  Physical Exam attentive vital signs are stable she is afebrile  No CVA tenderness  Lungs clear well ventilated  Cardiovascular get rhythm  Abdomen soft nontender no palpable mass organomegaly noted      Results for orders placed or performed in visit on 03/05/25   UA Macroscopic with reflex to Microscopic and Culture     Status: Abnormal    Specimen: Urine, Clean Catch   Result Value Ref Range    Color Urine Dark Yellow (A) Colorless, Straw, Light Yellow, Yellow    Appearance Urine Clear Clear    Glucose Urine Negative Negative mg/dL    Bilirubin Urine Negative Negative    Ketones Urine Trace (A) Negative mg/dL    Specific Gravity Urine 1.020 1.003 - 1.035    Blood Urine Negative Negative    pH Urine 7.0 5.0 - 7.0    Protein Albumin Urine Negative Negative mg/dL    Urobilinogen Urine 1.0 0.2, 1.0 E.U./dL    Nitrite Urine Negative Negative    Leukocyte Esterase Urine Trace (A) Negative   UA Microscopic with Reflex to Culture     Status: Abnormal   Result Value Ref Range    Bacteria Urine Moderate (A) None Seen /HPF    RBC Urine None Seen 0-2 /HPF /HPF    WBC Urine 5-10 (A) 0-5 /HPF /HPF    Squamous Epithelials Urine Many (A) None Seen /LPF    WBC Clumps Urine Present (A) None Seen /HPF    Mucus Urine Present (A) None Seen /LPF    Narrative    Urine Culture not indicated             Signed Electronically by: VIV Lerner    "

## 2025-04-23 ENCOUNTER — TELEPHONE (OUTPATIENT)
Dept: NEUROLOGY | Facility: CLINIC | Age: 60
End: 2025-04-23
Payer: COMMERCIAL

## 2025-04-23 NOTE — TELEPHONE ENCOUNTER
RN returned call to Barbraa and relayed the following from Dr. Brown:    Qsymia combines phentermine and topiramate, while Contrave combines naltrexone and bupropion     She cannot take the Qsymia with the Zonegran.  We would need to stop the Zonegran if she needs to be on this medication.  The Contrave should be fine but I would recommend checking with the pharmacist/PCP/prescribing provider as well.    Barbara expressed understand and told RN she didn't have any additional questions at this time.    Colette Philip RN, BSN  [facility]

## 2025-04-23 NOTE — TELEPHONE ENCOUNTER
Health Call Center    Phone Message    May a detailed message be left on voicemail: yes     Reason for Call: Other:       Pt requesting call back to speak with a nurse regarding medications. Pt states she is taking zonisamide (ZONEGRAN) 100 MG capsule. Pt is wondering if Qsymia can be taken with this medication. Pt states she has another medication to ask about as well, but does not have that medication information on her. Pt would like a call back to further discuss.     Pt's call back # [phone]    Action Taken: Message routed to:  [facility]: [facility]    Travel Screening: Not Applicable

## 2025-04-23 NOTE — TELEPHONE ENCOUNTER
Qsymia combines phentermine and topiramate, while Contrave combines naltrexone and bupropion    She cannot take the Qsymia with the Zonegran.  We would need to stop the Zonegran if she needs to be on this medication.  The Contrave should be fine but I would recommend checking with the pharmacist/PCP/prescribing provider as well.

## 2025-04-23 NOTE — TELEPHONE ENCOUNTER
RN returned call to Barbara.    Barbara told RN she had an appointment with her PCP today and discussed the possibility of starting weight loss medication. Barbara was given two options for weight loss medications - Qsymia and Contrave - and was told to talk with her neurologist and see if either of those medications were OK to take with her Zonegran. RN used Micromedex and compared both drugs to Zonegran. Per Micromedex both medications have risks with concurrent use of Zonegran.     RN told Barbara that Dr. Brown will be notified of her questions regarding both those medications and if it's safe to take with her current Zonegran.    Routing to Dr. Brown for review.    Colette Philip RN, BSN  [facility]

## 2025-07-08 DIAGNOSIS — G43.711 INTRACTABLE CHRONIC MIGRAINE WITHOUT AURA AND WITH STATUS MIGRAINOSUS: ICD-10-CM

## 2025-07-08 RX ORDER — ZONISAMIDE 50 MG/1
50 CAPSULE ORAL AT BEDTIME
Qty: 90 CAPSULE | Refills: 0 | Status: SHIPPED | OUTPATIENT
Start: 2025-07-08

## 2025-07-08 NOTE — TELEPHONE ENCOUNTER
Refill request for: Zonisamide    Directions: Take 1 capsule at bedtime     LOV: 8/9/23  NOV: 10/08/25    90 day supply with 0 refills Medication T'd for review and signature